# Patient Record
Sex: MALE | Race: WHITE | NOT HISPANIC OR LATINO | Employment: OTHER | URBAN - METROPOLITAN AREA
[De-identification: names, ages, dates, MRNs, and addresses within clinical notes are randomized per-mention and may not be internally consistent; named-entity substitution may affect disease eponyms.]

---

## 2017-09-07 ENCOUNTER — OFFICE VISIT (OUTPATIENT)
Dept: SLEEP MEDICINE | Facility: CLINIC | Age: 77
End: 2017-09-07
Payer: MEDICARE

## 2017-09-07 VITALS
HEIGHT: 69 IN | HEART RATE: 67 BPM | RESPIRATION RATE: 16 BRPM | WEIGHT: 227.4 LBS | OXYGEN SATURATION: 96 % | SYSTOLIC BLOOD PRESSURE: 122 MMHG | DIASTOLIC BLOOD PRESSURE: 77 MMHG | BODY MASS INDEX: 33.68 KG/M2

## 2017-09-07 DIAGNOSIS — G47.33 OSA (OBSTRUCTIVE SLEEP APNEA): Primary | ICD-10-CM

## 2017-09-07 PROCEDURE — 99214 OFFICE O/P EST MOD 30 MIN: CPT | Performed by: PHYSICIAN ASSISTANT

## 2017-09-07 NOTE — MR AVS SNAPSHOT
After Visit Summary   9/7/2017    Ranjan Pena    MRN: [id number]           Patient Information     Date Of Birth          1940        Visit Information        Provider Department      9/7/2017 10:30 AM Goltz, Bennett Ezra, PA-C [facility]        Today's Diagnoses     DEB (obstructive sleep apnea)    -  1      Care Instructions      Your BMI is Body mass index is 33.58 kg/(m^2).  Weight management is a personal decision.  If you are interested in exploring weight loss strategies, the following discussion covers the approaches that may be successful. Body mass index (BMI) is one way to tell whether you are at a healthy weight, overweight, or obese. It measures your weight in relation to your height.  A BMI of 18.5 to 24.9 is in the healthy range. A person with a BMI of 25 to 29.9 is considered overweight, and someone with a BMI of 30 or greater is considered obese. More than two-thirds of American adults are considered overweight or obese.  Being overweight or obese increases the risk for further weight gain. Excess weight may lead to heart disease and diabetes.  Creating and following plans for healthy eating and physical activity may help you improve your health.  Weight control is part of healthy lifestyle and includes exercise, emotional health, and healthy eating habits. Careful eating habits lifelong are the mainstay of weight control. Though there are significant health benefits from weight loss, long-term weight loss with diet alone may be very difficult to achieve- studies show long-term success with dietary management in less than 10% of people. Attaining a healthy weight may be especially difficult to achieve in those with severe obesity. In some cases, medications, devices and surgical management might be considered.  What can you do?  If you are overweight or obese and are interested in methods for weight loss, you should discuss this with your provider.      Consider reducing daily calorie intake by 500 calories.     Keep a food journal.     Avoiding skipping meals, consider cutting portions instead.    Diet combined with exercise helps maintain muscle while optimizing fat loss. Strength training is particularly important for building and maintaining muscle mass. Exercise helps reduce stress, increase energy, and improves fitness. Increasing exercise without diet control, however, may not burn enough calories to loose weight.       Start walking three days a week 10-20 minutes at a time    Work towards walking thirty minutes five days a week     Eventually, increase the speed of your walking for 1-2 minutes at time    In addition, we recommend that you review healthy lifestyles and methods for weight loss available through the National Institutes of Health patient information sites:  http://win.niddk.nih.gov/publications/index.htm    And look into health and wellness programs that may be available through your health insurance provider, employer, local community center, or neeraj club.    Weight management plan: Patient was referred to their PCP to discuss a diet and exercise plan.            Follow-ups after your visit        Who to contact     If you have questions or need follow up information about today's clinic visit or your schedule please contact [facility] directly at [phone].  Normal or non-critical lab and imaging results will be communicated to you by MyChart, letter or phone within 4 business days after the clinic has received the results. If you do not hear from us within 7 days, please contact the clinic through FPSIhart or phone. If you have a critical or abnormal lab result, we will notify you by phone as soon as possible.  Submit refill requests through [employer] or call your pharmacy and they will forward the refill request to us. Please allow 3 business days for your refill to be completed.          Additional Information About  "Your Visit        MyChart Information     [employer] ([employer]le[employer]) lets you send messages to your doctor, view your test results, renew your prescriptions, schedule appointments and more. To sign up, go to [email or web address] (CircleMe) . Click on \"Log in\" on the left side of the screen, which will take you to the Welcome page. Then click on \"Sign up Now\" on the right side of the page.     You will be asked to enter the access code listed below, as well as some personal information. Please follow the directions to create your username and password.     Your access code is: [email or web address]  Expires: 2017 10:47 AM     Your access code will  in 90 days. If you need help or a new code, please call your [place] clinic or [phone].        Care EveryWhere ID     This is your Care EveryWhere ID. This could be used by other organizations to access your [place] medical records  [id number]        Your Vitals Were     Pulse Respirations Height Pulse Oximetry BMI (Body Mass Index)       67 16 1.753 m (5' 9\") 96% 33.58 kg/m2        Blood Pressure from Last 3 Encounters:   17 122/77   16 120/78   16 136/85    Weight from Last 3 Encounters:   17 103.1 kg (227 lb 6.4 oz)   16 99.7 kg (219 lb 11.2 oz)   16 99.3 kg (219 lb)              We Performed the Following     Comprehensive DME        Primary Care Provider Office Phone # Fax #    Peter Nicole [phone]       [address] [place] [phone]        Equal Access to Services     [facility] DENNIS : Hadii vangie Bush, wadianelysda gopi, qaybta félixalmagopal keller, berhane pérez. So [place] [phone].    ATENCIÓN: Si habla español, tiene a pratt disposición servicios gratuitos de asistencia lingüística. Llame al [phone].    We comply with applicable federal civil rights laws and [place] laws. We do not discriminate on the basis of race, color, national origin, age, disability sex, sexual " orientation or gender identity.            Thank you!     Thank you for choosing [facility]  for your care. Our goal is always to provide you with excellent care. Hearing back from our patients is one way we can continue to improve our services. Please take a few minutes to complete the written survey that you may receive in the mail after your visit with us. Thank you!             Your Updated Medication List - Protect others around you: Learn how to safely use, store and throw away your medicines at www.disposemymeds.org.          This list is accurate as of: 9/7/17 10:47 AM.  Always use your most recent med list.                   Brand Name Dispense Instructions for use Diagnosis    albuterol 108 (90 BASE) MCG/ACT Inhaler    PROAIR HFA/PROVENTIL HFA/VENTOLIN HFA    1 Inhaler    Inhale 1 puff into the lungs every 4 hours as needed for shortness of breath / dyspnea or wheezing    Intermittent asthma, uncomplicated       FLOMAX 0.4 MG capsule   Generic drug:  tamsulosin      Take 1 capsule (0.4 mg) by mouth daily    BPH (benign prostatic hypertrophy)       fluticasone 110 MCG/ACT Inhaler    FLOVENT HFA    1 Inhaler    Inhale 2 puffs into the lungs 2 times daily    Intermittent asthma, with acute exacerbation       loratadine 10 MG capsule    CLARITIN    30 capsule    Take 10 mg by mouth daily. For allergies    ETD (eustachian tube dysfunction)       meclizine 25 MG tablet    ANTIVERT    30 tablet    Take 1 tablet (25 mg) by mouth 3 times daily as needed For dizziness    ETD (eustachian tube dysfunction)       order for DME     1 each    Equipment being ordered: CPAP machine + all supplies incld mask    DEB (obstructive sleep apnea)       timolol 0.5 % ophthalmic solution    TIMOPTIC          XALATAN 0.005 % ophthalmic solution   Generic drug:  latanoprost      Place 1 drop into both eyes At Bedtime

## 2017-09-07 NOTE — PATIENT INSTRUCTIONS

## 2017-09-07 NOTE — NURSING NOTE
"Chief Complaint   Patient presents with     CPAP Follow Up     annual f/u       Initial /77  Pulse 67  Resp 16  Ht 1.753 m (5' 9\")  Wt 103.1 kg (227 lb 6.4 oz)  SpO2 96%  BMI 33.58 kg/m2 Estimated body mass index is 33.58 kg/(m^2) as calculated from the following:    Height as of this encounter: 1.753 m (5' 9\").    Weight as of this encounter: 103.1 kg (227 lb 6.4 oz).  Medication Reconciliation: complete     ESS 5  Louisa Rangel    "

## 2017-09-20 ENCOUNTER — OFFICE VISIT (OUTPATIENT)
Dept: INTERNAL MEDICINE | Facility: CLINIC | Age: 77
End: 2017-09-20
Payer: MEDICARE

## 2017-09-20 VITALS
OXYGEN SATURATION: 95 % | HEART RATE: 55 BPM | DIASTOLIC BLOOD PRESSURE: 78 MMHG | BODY MASS INDEX: 33.14 KG/M2 | SYSTOLIC BLOOD PRESSURE: 126 MMHG | TEMPERATURE: 98.1 F | WEIGHT: 224.4 LBS

## 2017-09-20 DIAGNOSIS — J45.30 MILD PERSISTENT ASTHMA WITHOUT COMPLICATION: ICD-10-CM

## 2017-09-20 DIAGNOSIS — Z13.6 CARDIOVASCULAR SCREENING; LDL GOAL LESS THAN 160: ICD-10-CM

## 2017-09-20 DIAGNOSIS — Z00.00 MEDICARE ANNUAL WELLNESS VISIT, SUBSEQUENT: Primary | ICD-10-CM

## 2017-09-20 PROCEDURE — G0439 PPPS, SUBSEQ VISIT: HCPCS | Performed by: INTERNAL MEDICINE

## 2017-09-20 RX ORDER — FLUTICASONE PROPIONATE 110 UG/1
2 AEROSOL, METERED RESPIRATORY (INHALATION) 2 TIMES DAILY
Qty: 1 INHALER | Refills: 11 | Status: SHIPPED | OUTPATIENT
Start: 2017-09-20 | End: 2020-08-24

## 2017-09-20 RX ORDER — ALBUTEROL SULFATE 90 UG/1
1 AEROSOL, METERED RESPIRATORY (INHALATION) EVERY 4 HOURS PRN
Qty: 1 INHALER | Refills: 11 | Status: SHIPPED | OUTPATIENT
Start: 2017-09-20 | End: 2020-08-24

## 2017-09-20 NOTE — PROGRESS NOTES
SUBJECTIVE:   Ranjan Pena is a 77 year old male who presents for Preventive Visit.    Are you in the first 12 months of your Medicare coverage?  No    Physical   Annual:     Getting at least 3 servings of Calcium per day::  Yes    Bi-annual eye exam::  Yes    Dental care twice a year::  Yes    Sleep apnea or symptoms of sleep apnea::  Sleep apnea    Taking medications regularly::  Yes    Additional concerns today::  No      COGNITIVE SCREEN  1) Repeat 3 items (Banana, Sunrise, Chair)    2) Clock draw: NORMAL  3) 3 item recall: Recalls 2 objects   Results: NORMAL clock, 1-2 items recalled: COGNITIVE IMPAIRMENT LESS LIKELY    Mini-CogTM Copyright S Toni. Licensed by the author for use in [facility]; reprinted with permission (soob@[facility]). All rights reserved.      Reviewed and updated as needed this visit by clinical staff         Reviewed and updated as needed this visit by Provider      Social History   Substance Use Topics     Smoking status: Former Smoker     Packs/day: 1.00     Years: 5.00     Types: Cigarettes     Quit date: 1/1/1965     Smokeless tobacco: Never Used     Alcohol use 0.6 oz/week     1 Standard drinks or equivalent per week      Comment: 1 per month       The patient does not drink >3 drinks per day nor >7 drinks per week.        Today's PHQ-2 Score: PHQ-2 ( 1999 Pfizer) 9/20/2017   Q1: Little interest or pleasure in doing things 0   Q2: Feeling down, depressed or hopeless 0   PHQ-2 Score 0   Q1: Little interest or pleasure in doing things Not at all   Q2: Feeling down, depressed or hopeless Not at all   PHQ-2 Score 0       Do you feel safe in your environment - Yes    Do you have a Health Care Directive?: Yes: Advance Directive has been received and scanned.    Current providers sharing in care for this patient include:   Patient Care Team:  Peter Nicole MD as PCP - General (Internal Medicine)      Hearing impairment: No    Ability to successfully perform activities of  "daily living: Yes, no assistance needed     Fall risk:  Fallen 2 or more times in the past year?: No  Any fall with injury in the past year?: No      Home safety:  none identified      The following health maintenance items are reviewed in Epic and correct as of today:Health Maintenance   Topic Date Due     MEDICARE ANNUAL WELLNESS VISIT  07/13/2016     ASTHMA CONTROL TEST Q6 MOS  09/17/2016     ASTHMA ACTION PLAN Q1 YR  03/17/2017     INFLUENZA VACCINE (SYSTEM ASSIGNED)  09/01/2017     FALL RISK ASSESSMENT  12/06/2017     LIPID SCREEN Q5 YR MALE (SYSTEM ASSIGNED)  06/04/2019     ADVANCE DIRECTIVE PLANNING Q5 YRS  06/10/2019     TETANUS IMMUNIZATION (SYSTEM ASSIGNED)  03/08/2026     PNEUMOCOCCAL  Completed     Labs reviewed in EPIC      ROS:  Constitutional, HEENT, cardiovascular, pulmonary, GI, , musculoskeletal, neuro, skin, endocrine and psych systems are negative, except as otherwise noted.      OBJECTIVE:   There were no vitals taken for this visit. Estimated body mass index is 33.58 kg/(m^2) as calculated from the following:    Height as of 9/7/17: 5' 9\" (1.753 m).    Weight as of 9/7/17: 227 lb 6.4 oz (103.1 kg).  EXAM:   GENERAL: healthy, alert and no distress  EYES: Eyes grossly normal to inspection, PERRL and conjunctivae and sclerae normal  HENT: ear canals and TM's normal, nose and mouth without ulcers or lesions  NECK: no adenopathy, no asymmetry, masses, or scars and thyroid normal to palpation  RESP: lungs clear to auscultation - no rales, rhonchi or wheezes  CV: regular rate and rhythm, normal S1 S2, no S3 or S4, no murmur, click or rub, no peripheral edema and peripheral pulses strong  ABDOMEN: soft, nontender, no hepatosplenomegaly, no masses and bowel sounds normal  MS: no gross musculoskeletal defects noted, no edema  SKIN: no suspicious lesions or rashes  NEURO: Normal strength and tone, mentation intact and speech normal  PSYCH: mentation appears normal, affect normal/bright       ASSESSMENT " "/ PLAN:   1. Medicare annual wellness visit, subsequent  2. CARDIOVASCULAR SCREENING; LDL GOAL LESS THAN 160  Doing quite well- uptodate on screening    3. Mild persistent asthma without complication  - fluticasone (FLOVENT HFA) 110 MCG/ACT Inhaler; Inhale 2 puffs into the lungs 2 times daily  Dispense: 1 Inhaler; Refill: 11  - albuterol (PROAIR HFA/PROVENTIL HFA/VENTOLIN HFA) 108 (90 BASE) MCG/ACT Inhaler; Inhale 1 puff into the lungs every 4 hours as needed for shortness of breath / dyspnea or wheezing  Dispense: 1 Inhaler; Refill: 11    End of Life Planning:  Patient currently has an advanced directive: Yes.  Practitioner is supportive of decision.    COUNSELING:  Reviewed preventive health counseling, as reflected in patient instructions        Estimated body mass index is 33.58 kg/(m^2) as calculated from the following:    Height as of 9/7/17: 5' 9\" (1.753 m).    Weight as of 9/7/17: 227 lb 6.4 oz (103.1 kg).     reports that he quit smoking about 52 years ago. His smoking use included Cigarettes. He has a 5.00 pack-year smoking history. He has never used smokeless tobacco.        Appropriate preventive services were discussed with this patient, including applicable screening as appropriate for cardiovascular disease, diabetes, osteopenia/osteoporosis, and glaucoma.  As appropriate for age/gender, discussed screening for colorectal cancer, prostate cancer, breast cancer, and cervical cancer. Checklist reviewing preventive services available has been given to the patient.    Reviewed patients plan of care and provided an AVS. The Basic Care Plan (routine screening as documented in Health Maintenance) for Ranjan meets the Care Plan requirement. This Care Plan has been established and reviewed with the Patient.    Counseling Resources:  ATP IV Guidelines  Pooled Cohorts Equation Calculator  Breast Cancer Risk Calculator  FRAX Risk Assessment  ICSI Preventive Guidelines  Dietary Guidelines for Americans, 2010  USDA's " MyPlate  ASA Prophylaxis  Lung CA Screening    Peter Nicole MD  [facility] for HPI/ROS submitted by the patient on 9/20/2017   PHQ-2 Score: 0

## 2017-09-20 NOTE — MR AVS SNAPSHOT
"              After Visit Summary   9/20/2017    Ranjan Pena    MRN: [id number]           Patient Information     Date Of Birth          1940        Visit Information        Provider Department      9/20/2017 7:20 AM Peter Nicole MD [facility]        Today's Diagnoses     Medicare annual wellness visit, subsequent    -  1    CARDIOVASCULAR SCREENING; LDL GOAL LESS THAN 160        Mild persistent asthma without complication           Follow-ups after your visit        Your next 10 appointments already scheduled     Sep 10, 2018  8:30 AM CDT   Return Sleep Patient with Bennett Ezra Goltz, PA-C   [facility] ([facility])    [address] [address] [phone]              Who to contact     If you have questions or need follow up information about today's clinic visit or your schedule please contact [facility] directly at [phone].  Normal or non-critical lab and imaging results will be communicated to you by MyChart, letter or phone within 4 business days after the clinic has received the results. If you do not hear from us within 7 days, please contact the clinic through [employer] or phone. If you have a critical or abnormal lab result, we will notify you by phone as soon as possible.  Submit refill requests through [employer] or call your pharmacy and they will forward the refill request to us. Please allow 3 business days for your refill to be completed.          Additional Information About Your Visit        It[employer]ix[employer] Information     [employer] lets you send messages to your doctor, view your test results, renew your prescriptions, schedule appointments and more. To sign up, go to [email or web address] . Click on \"Log in\" on the left side of the screen, which will take you to the Welcome page. Then click on \"Sign up Now\" on the right side of the page.     You will be asked to enter " the access code listed below, as well as some personal information. Please follow the directions to create your username and password.     Your access code is: [email or web address]  Expires: 2017 10:47 AM     Your access code will  in 90 days. If you need help or a new code, please call your [place] clinic or [phone].        Care EveryWhere ID     This is your Care EveryWhere ID. This could be used by other organizations to access your [place] medical records  [id number]        Your Vitals Were     Pulse Temperature Pulse Oximetry BMI (Body Mass Index)          55 98.1  F (36.7  C) (Oral) 95% 33.14 kg/m2         Blood Pressure from Last 3 Encounters:   17 126/78   17 122/77   16 120/78    Weight from Last 3 Encounters:   17 224 lb 6.4 oz (101.8 kg)   17 227 lb 6.4 oz (103.1 kg)   16 219 lb 11.2 oz (99.7 kg)              Today, you had the following     No orders found for display         Where to get your medicines      These medications were sent to [facility] [address] [place] [address] [address] [place] [address] [address] [facility] [address]    Hours:  24-hours Phone:  [phone]     albuterol 108 (90 BASE) MCG/ACT Inhaler    fluticasone 110 MCG/ACT Inhaler          Primary Care Provider Office Phone # Fax #    Peter Nicole [phone]       [address] [address]        Equal Access to Services     [facility]: Hadii vangie Bush, waaxda luqadaha, qaybta kaalmada arianna, berhane pérez. So [facility] [phone].    ATENCIÓN: Si habla español, tiene a pratt disposición servicios gratuitos de asistencia lingüística. Llame al [phone].    We comply with applicable federal civil rights laws and [place] laws. We do not discriminate on the basis of race, color, national origin, age, disability sex, sexual orientation or gender identity.             Thank you!     Thank you for choosing [facility]  for your care. Our goal is always to provide you with excellent care. Hearing back from our patients is one way we can continue to improve our services. Please take a few minutes to complete the written survey that you may receive in the mail after your visit with us. Thank you!             Your Updated Medication List - Protect others around you: Learn how to safely use, store and throw away your medicines at www.disposemymeds.org.          This list is accurate as of: 9/20/17  7:51 AM.  Always use your most recent med list.                   Brand Name Dispense Instructions for use Diagnosis    albuterol 108 (90 BASE) MCG/ACT Inhaler    PROAIR HFA/PROVENTIL HFA/VENTOLIN HFA    1 Inhaler    Inhale 1 puff into the lungs every 4 hours as needed for shortness of breath / dyspnea or wheezing    Mild persistent asthma without complication       FLOMAX 0.4 MG capsule   Generic drug:  tamsulosin      Take 1 capsule (0.4 mg) by mouth daily    BPH (benign prostatic hypertrophy)       fluticasone 110 MCG/ACT Inhaler    FLOVENT HFA    1 Inhaler    Inhale 2 puffs into the lungs 2 times daily    Mild persistent asthma without complication       loratadine 10 MG capsule    CLARITIN    30 capsule    Take 10 mg by mouth daily. For allergies    ETD (eustachian tube dysfunction)       meclizine 25 MG tablet    ANTIVERT    30 tablet    Take 1 tablet (25 mg) by mouth 3 times daily as needed For dizziness    ETD (eustachian tube dysfunction)       order for DME     1 each    Equipment being ordered: CPAP machine + all supplies incld mask    DEB (obstructive sleep apnea)       timolol 0.5 % ophthalmic solution    TIMOPTIC          XALATAN 0.005 % ophthalmic solution   Generic drug:  latanoprost      Place 1 drop into both eyes At Bedtime

## 2017-09-21 DIAGNOSIS — Z13.6 CARDIOVASCULAR SCREENING; LDL GOAL LESS THAN 160: ICD-10-CM

## 2017-09-21 LAB
ANION GAP SERPL CALCULATED.3IONS-SCNC: 8 MMOL/L (ref 3–14)
BUN SERPL-MCNC: 15 MG/DL (ref 7–30)
CALCIUM SERPL-MCNC: 8.7 MG/DL (ref 8.5–10.1)
CHLORIDE SERPL-SCNC: 108 MMOL/L (ref 94–109)
CHOLEST SERPL-MCNC: 138 MG/DL
CO2 SERPL-SCNC: 25 MMOL/L (ref 20–32)
CREAT SERPL-MCNC: 1.17 MG/DL (ref 0.66–1.25)
GFR SERPL CREATININE-BSD FRML MDRD: 60 ML/MIN/1.7M2
GLUCOSE SERPL-MCNC: 98 MG/DL (ref 70–99)
HDLC SERPL-MCNC: 43 MG/DL
LDLC SERPL CALC-MCNC: 69 MG/DL
NONHDLC SERPL-MCNC: 95 MG/DL
POTASSIUM SERPL-SCNC: 4.1 MMOL/L (ref 3.4–5.3)
SODIUM SERPL-SCNC: 141 MMOL/L (ref 133–144)
TRIGL SERPL-MCNC: 130 MG/DL

## 2017-09-21 PROCEDURE — 80061 LIPID PANEL: CPT | Performed by: INTERNAL MEDICINE

## 2017-09-21 PROCEDURE — 80048 BASIC METABOLIC PNL TOTAL CA: CPT | Performed by: INTERNAL MEDICINE

## 2017-09-21 PROCEDURE — 36415 COLL VENOUS BLD VENIPUNCTURE: CPT | Performed by: INTERNAL MEDICINE

## 2018-02-22 ENCOUNTER — TRANSFERRED RECORDS (OUTPATIENT)
Dept: HEALTH INFORMATION MANAGEMENT | Facility: CLINIC | Age: 78
End: 2018-02-22

## 2018-02-22 LAB — EJECTION FRACTION: 53

## 2018-02-28 ENCOUNTER — TRANSFERRED RECORDS (OUTPATIENT)
Dept: HEALTH INFORMATION MANAGEMENT | Facility: CLINIC | Age: 78
End: 2018-02-28

## 2018-03-19 ENCOUNTER — OFFICE VISIT (OUTPATIENT)
Dept: INTERNAL MEDICINE | Facility: CLINIC | Age: 78
End: 2018-03-19
Payer: MEDICARE

## 2018-03-19 VITALS
RESPIRATION RATE: 16 BRPM | WEIGHT: 220.6 LBS | SYSTOLIC BLOOD PRESSURE: 126 MMHG | TEMPERATURE: 98.2 F | DIASTOLIC BLOOD PRESSURE: 80 MMHG | HEART RATE: 65 BPM | BODY MASS INDEX: 32.67 KG/M2 | HEIGHT: 69 IN | OXYGEN SATURATION: 95 %

## 2018-03-19 DIAGNOSIS — J06.9 VIRAL URI WITH COUGH: ICD-10-CM

## 2018-03-19 DIAGNOSIS — M79.672 PAIN OF LEFT HEEL: ICD-10-CM

## 2018-03-19 DIAGNOSIS — J45.30 MILD PERSISTENT ASTHMA WITHOUT COMPLICATION: Primary | ICD-10-CM

## 2018-03-19 DIAGNOSIS — G47.33 OSA (OBSTRUCTIVE SLEEP APNEA): ICD-10-CM

## 2018-03-19 PROCEDURE — 99214 OFFICE O/P EST MOD 30 MIN: CPT | Performed by: INTERNAL MEDICINE

## 2018-03-19 ASSESSMENT — PAIN SCALES - GENERAL: PAINLEVEL: NO PAIN (0)

## 2018-03-19 NOTE — PROGRESS NOTES
"  SUBJECTIVE:   Ranjan Pena is a 77 year old male who presents to clinic today for the following health issues:    Patient needs replacement parts for his cpap    Also c/o rhinorrhea, cough- non-productive.  Has asthma but hasn't noticed any need to use his inhaler.     He also brings in 2 medical reports from [place]. Went into doctors office feeling fatigued and thinking he had the flu. Noted to be a bit bradycardiac- sent to a cardiologist. Had echo and stress thallium . Both normal.    Problem list and histories reviewed & adjusted, as indicated.  Additional history: as documented    Labs reviewed in EPIC    Reviewed and updated as needed this visit by clinical staff  Allergies  Meds       Reviewed and updated as needed this visit by Provider         ROS:  Constitutional, HEENT, cardiovascular, pulmonary, gi and gu systems are negative, except as otherwise noted.  MUSCULOSKELETAL: neck stiffness w/some limitation in ROM, plantar foot pain     OBJECTIVE:                                                    /80  Pulse 65  Temp 98.2  F (36.8  C) (Oral)  Resp 16  Ht 5' 9\" (1.753 m)  Wt 220 lb 9.6 oz (100.1 kg)  SpO2 95%  BMI 32.58 kg/m2  Body mass index is 32.58 kg/(m^2).  GENERAL APPEARANCE: alert and no distress  Neck: slight limitation in flexion and extension.   RESP: lungs clear to auscultation - no rales, rhonchi or wheezes  CV: regular rates and rhythm, normal S1 S2, no S3 or S4 and no murmur, click or rub  MS: extremities normal- no gross deformities noted. Mild tenderness L heel- plantar surface.      Diagnostic test results:  none      ASSESSMENT/PLAN:                                                    1. DEB (obstructive sleep apnea)  - order for DME; Equipment being ordered: CPAP machine + all supplies incld mask  Dispense: 1 each; Refill: 0    2.  Mild persistent asthma without complication  3. URI  - monitor    4. Heel pain  consistent with heel spur vs very mild PF  Stretching, heel pad prn "       Fall 2018    Peter Nicole MD  [facility]

## 2018-03-19 NOTE — MR AVS SNAPSHOT
"              After Visit Summary   3/19/2018    Ranjan Pena    MRN: [id number]           Patient Information     Date Of Birth          1940        Visit Information        Provider Department      3/19/2018 7:00 AM Peter Nicole MD [facility]        Today's Diagnoses     Mild persistent asthma without complication    -  1    EDB (obstructive sleep apnea)           Follow-ups after your visit        Your next 10 appointments already scheduled     Sep 10, 2018  8:30 AM CDT   Return Sleep Patient with Bennett Ezra Goltz, PA-C   [facility] ([facility])    [address] [address] [phone]              Who to contact     If you have questions or need follow up information about today's clinic visit or your schedule please contact [facility] [place] directly at [phone].  Normal or non-critical lab and imaging results will be communicated to you by MyChart, letter or phone within 4 business days after the clinic has received the results. If you do not hear from us within 7 days, please contact the clinic through Unowhyhart or phone. If you have a critical or abnormal lab result, we will notify you by phone as soon as possible.  Submit refill requests through IQMax or call your pharmacy and they will forward the refill request to us. Please allow 3 business days for your refill to be completed.          Additional Information About Your Visit        MyChart Information     IQMax lets you send messages to your doctor, view your test results, renew your prescriptions, schedule appointments and more. To sign up, go to [email or web address] . Click on \"Log in\" on the left side of the screen, which will take you to the Welcome page. Then click on \"Sign up Now\" on the right side of the page.     You will be asked to enter the access code listed below, as well as some personal information. " "Please follow the directions to create your username and password.     Your access code is: [email or web address]  Expires: 2018  7:34 AM     Your access code will  in 90 days. If you need help or a new code, please call your [place] clinic or [phone].        Care EveryWhere ID     This is your Care EveryWhere ID. This could be used by other organizations to access your [place] medical records  [id number]        Your Vitals Were     Pulse Temperature Respirations Height Pulse Oximetry BMI (Body Mass Index)    65 98.2  F (36.8  C) (Oral) 16 5' 9\" (1.753 m) 95% 32.58 kg/m2       Blood Pressure from Last 3 Encounters:   18 126/80   17 126/78   17 122/77    Weight from Last 3 Encounters:   18 220 lb 9.6 oz (100.1 kg)   17 224 lb 6.4 oz (101.8 kg)   17 227 lb 6.4 oz (103.1 kg)              Today, you had the following     No orders found for display         Where to get your medicines      Some of these will need a paper prescription and others can be bought over the counter.  Ask your nurse if you have questions.     Bring a paper prescription for each of these medications     order for DME          Primary Care Provider Office Phone # Fax #    Peter Nicole [phone]       [address] [place] [address]        Equal Access to Services     ABRIL COLLINS AH: Hadii aad ku hadasho Soyazminali, waaxda luqadaha, qaybta kaalmada arianna, berhane de la cruz . So [facility] [phone].    ATENCIÓN: Si habla español, tiene a pratt disposición servicios gratuitos de asistencia lingüística. Llame al [phone].    We comply with applicable federal civil rights laws and [place] laws. We do not discriminate on the basis of race, color, national origin, age, disability, sex, sexual orientation, or gender identity.            Thank you!     Thank you for choosing [facility]  for your care. Our goal is always to " provide you with excellent care. Hearing back from our patients is one way we can continue to improve our services. Please take a few minutes to complete the written survey that you may receive in the mail after your visit with us. Thank you!             Your Updated Medication List - Protect others around you: Learn how to safely use, store and throw away your medicines at www.disposemymeds.org.          This list is accurate as of 3/19/18  7:34 AM.  Always use your most recent med list.                   Brand Name Dispense Instructions for use Diagnosis    albuterol 108 (90 BASE) MCG/ACT Inhaler    PROAIR HFA/PROVENTIL HFA/VENTOLIN HFA    1 Inhaler    Inhale 1 puff into the lungs every 4 hours as needed for shortness of breath / dyspnea or wheezing    Mild persistent asthma without complication       FLOMAX 0.4 MG capsule   Generic drug:  tamsulosin      Take 1 capsule (0.4 mg) by mouth daily    BPH (benign prostatic hypertrophy)       fluticasone 110 MCG/ACT Inhaler    FLOVENT HFA    1 Inhaler    Inhale 2 puffs into the lungs 2 times daily    Mild persistent asthma without complication       loratadine 10 MG capsule    CLARITIN    30 capsule    Take 10 mg by mouth daily. For allergies    ETD (eustachian tube dysfunction)       meclizine 25 MG tablet    ANTIVERT    30 tablet    Take 1 tablet (25 mg) by mouth 3 times daily as needed For dizziness    ETD (eustachian tube dysfunction)       order for DME     1 each    Equipment being ordered: CPAP machine + all supplies incld mask    DEB (obstructive sleep apnea)       timolol 0.5 % ophthalmic solution    TIMOPTIC          XALATAN 0.005 % ophthalmic solution   Generic drug:  latanoprost      Place 1 drop into both eyes At Bedtime

## 2018-07-11 ENCOUNTER — TELEPHONE (OUTPATIENT)
Dept: INTERNAL MEDICINE | Facility: CLINIC | Age: 78
End: 2018-07-11

## 2018-07-11 DIAGNOSIS — G47.33 OSA (OBSTRUCTIVE SLEEP APNEA): Primary | ICD-10-CM

## 2018-07-11 NOTE — TELEPHONE ENCOUNTER
"Patient is needing order rewritten for cpap machine because they are needing the pressure # on the rx in order for them to provide patient with equipment. Pressure # is \"6\" Patient is also asking If he could get this order by tomorrow and he will pick it up from . Please contact patient when order is ready.   "

## 2018-07-11 NOTE — TELEPHONE ENCOUNTER
An order was placed for a replacement auto CPAP with pressures 6-10 cm. He was thinking about using a home medical company in [place] where he lives part of the time.  He will be leaving to go there next week.  I gave him contact information for the [facility] medical location so he can call them and coordinate either an appointment to get machine through them or to have the necessary paperwork sent to wherever he would like to go in [place].  Bennett Goltz, PA-C

## 2018-07-11 NOTE — TELEPHONE ENCOUNTER
This should be done thru the  sleep clinic.  Inform pt we can forward this message to them to provide the needed specific documentation for his CPAP supplies.

## 2018-07-12 ENCOUNTER — DOCUMENTATION ONLY (OUTPATIENT)
Dept: SLEEP MEDICINE | Facility: CLINIC | Age: 78
End: 2018-07-12

## 2018-07-12 NOTE — Clinical Note
Hi Bennett Goltz, PA-C,  I called patient to try and transfer his DME care for CPAP and supplies to [facility]. Patient stated that he met with our staff at [facility] and received all the information on the transfer process. He stated he is leaving on vacation soon and will be gone for a long period of time. Patient said he does not want to transfer his care to [facility] at this time. He has the paperwork and our phone number if he decides to switch over to [place] [facility]. Patient stated he will think about it and may call us around October.   Thanks, Crystal MONTEIRO

## 2018-07-12 NOTE — PROGRESS NOTES
Received documents for a replacement PAP and supplies. Called patient to see if he would like to transfer his DME care to [facility] for his PAP and supplies. Patient stated that he is leaving town soon and does not want to transfer his care at this time. Patient was given our phone number and stated he will think about it in October. Patient knows he can call [facility] if he has any further questions.

## 2018-10-01 ENCOUNTER — OFFICE VISIT (OUTPATIENT)
Dept: SLEEP MEDICINE | Facility: CLINIC | Age: 78
End: 2018-10-01
Payer: MEDICARE

## 2018-10-01 VITALS
TEMPERATURE: 97.5 F | WEIGHT: 222.4 LBS | DIASTOLIC BLOOD PRESSURE: 83 MMHG | HEART RATE: 62 BPM | RESPIRATION RATE: 16 BRPM | SYSTOLIC BLOOD PRESSURE: 134 MMHG | OXYGEN SATURATION: 95 % | HEIGHT: 69 IN | BODY MASS INDEX: 32.94 KG/M2

## 2018-10-01 DIAGNOSIS — G47.33 OSA (OBSTRUCTIVE SLEEP APNEA): Primary | ICD-10-CM

## 2018-10-01 PROCEDURE — 99214 OFFICE O/P EST MOD 30 MIN: CPT | Performed by: PHYSICIAN ASSISTANT

## 2018-10-01 NOTE — PATIENT INSTRUCTIONS

## 2018-10-01 NOTE — PROGRESS NOTES
Sleep Study Follow-Up Visit:    Date on this visit: 10/1/2018    Ranjan Pena comes in today for follow-up of his CPAP use for treatment of moderate DEB and hypoxemia. He was initially seen at the [facility] for trouble keeping his CPAP mask on through the night for 18 years. His medical history is significant for DEB (initially diagnosed in 2006), asthma, glaucoma and nephrolithiasis.      His sleep study here showed:  AHI was 29.8/hr, with desaturations down to 74%. He spent 17.2 minutes below 90% SpO2 and 9.9 minutes below 89%. RDI 29.8/hr.  REM RDI 39.4/hr.  Supine RDI 59/hr.  His lateral RDI was 12/hr. Periodic Limb Movement Index 24/hour, <1/hr were associated with arousals.      He is on auto CPAP 6-10 cm. He uses a nasal pillows mask. He was trying to get a new machine from a place in [place] (where he lives half the time). They have been giving him the run around. His mask fits well and he likes the mask. He occasionally gets dry mouth. His wife occasionally observes snoring with CPAP. He has felt the pressures were a little high at times. He does not use the humidifier.   The compliance data shows that he has used the CPAP for 29/30 nights, 66.7% of nights for >4 hours.  The 90th% pressure is 9.4 cm.  The average time in large leak is 6 sec.  The average nightly usage is 5:23.  The average AHI is 1.7/hr. His pressures often start at the lower limit for a couple of hours and then they shoot up to the upper limit in response to some snoring. He sometimes gets mouth leak when the pressure is at the upper limit.    Past medical/surgical history, family history, social history, medications and allergies were reviewed.      Problem List:  Patient Active Problem List    Diagnosis Date Noted     Bladder polyp 06/04/2014     Priority: Medium     DEB (obstructive sleep apnea)      Priority: Medium     Split Night:  Sleep Study 6/9/2016 - (212.0 lbs) AHI 29.8, RDI 29.8, Supine AHI 59.0, REM  AHI 47.1, Low O2% 73.7%, Time Spent ?88% 9.9, Time Spent ?89% 17.2, CPAP was titrated to a pressure of 11.       Advance Care Planning 12/20/2011     Priority: Medium     Advance Care Planning: Receipt of ACP document:  Received: Health Care Directive which was witnessed or notarized on 6-10-14.  Document previously scanned on 6-11-14.  Validation form completed and sent to be scanned.  Code Status reflects choices in most recent ACP document.  Confirmed/documented designated decision maker(s). See permanent comments section of demographics in clinical tab. View document(s) and details by clicking on code status. Added by Marilyn Downs RN, System ACP Coordinator [employer] on 7/2/2014.  Advance Care Planning: Initial facilitation introduction:   Ranjan Pena presented for initial session regarding ACP at the clinic. He was accompanied by self. [employer] Choices information provided and resources reviewed. He currently wishes to complete an ACP document.  He currently has the following questions or concerns about Advance Care Planning: none.  Confirmed/documented designated decision maker(s). See permanent comments section of demographics in clinical tab. Orders entered for code status to reflect current choices.Added by Nancy Fischer on 6/10/2014  Patient states has Advance Directive and will bring in a copy to clinic. 12/20/2011 Brionna Martinez           Mild persistent asthma      Priority: Medium     CARDIOVASCULAR SCREENING; LDL GOAL LESS THAN 160 10/31/2010     Priority: Medium        Impression/Plan:    (G47.33) DEB (obstructive sleep apnea)  (primary encounter diagnosis)  Comment: His total use is improving, but still just under compliance recommendations. He sometimes does not put the mask back on when he gets up in the middle of the night. He sometimes gets mouth leak when the pressure goes up to the upper limit (which is often in response to snoring or flow limitation). The knob is broken on his  machine.  Plan: Comprehensive DME        I changed his pressure to 7-9 cm to try to reduce mouth leak. I placed the order for a replacement device again as the settings are different than the old order. We reviewed compliance goals. He was encouraged to put the mask back on when he gets up for the restroom. We reviewed that cardiovascular benefit starts at 4 hours of use per night. I will check a download from his new machine a couple of months and see if his usage is getting better and if he still appears to have mouth leak. He was encouraged to let me know if he finds the pressure change to be a problem. He was shown where he can get a new knob for his machine online.      He will follow up with me in about 1 year(s).     Twenty-five minutes spent with patient, all of which were spent face-to-face counseling, consulting, coordinating plan of care.      Bennett Goltz, PA-C    CC: No ref. provider found

## 2018-10-01 NOTE — NURSING NOTE
"Chief Complaint   Patient presents with     CPAP Follow Up       Initial /83  Pulse 62  Temp 97.5  F (36.4  C) (Oral)  Resp 16  Ht 1.753 m (5' 9\")  Wt 100.9 kg (222 lb 6.4 oz)  SpO2 95%  BMI 32.84 kg/m2 Estimated body mass index is 32.84 kg/(m^2) as calculated from the following:    Height as of this encounter: 1.753 m (5' 9\").    Weight as of this encounter: 100.9 kg (222 lb 6.4 oz).    Medication Reconciliation: complete     ESS 3  Louisa Rangel      "

## 2018-10-01 NOTE — MR AVS SNAPSHOT
After Visit Summary   10/1/2018    Ranjan Pena    MRN: [id number]           Patient Information     Date Of Birth          1940        Visit Information        Provider Department      10/1/2018 10:00 AM Goltz, Bennett Ezra, PA-C [facility]        Today's Diagnoses     DEB (obstructive sleep apnea)    -  1      Care Instructions      Your BMI is Body mass index is 32.84 kg/(m^2).  Weight management is a personal decision.  If you are interested in exploring weight loss strategies, the following discussion covers the approaches that may be successful. Body mass index (BMI) is one way to tell whether you are at a healthy weight, overweight, or obese. It measures your weight in relation to your height.  A BMI of 18.5 to 24.9 is in the healthy range. A person with a BMI of 25 to 29.9 is considered overweight, and someone with a BMI of 30 or greater is considered obese. More than two-thirds of American adults are considered overweight or obese.  Being overweight or obese increases the risk for further weight gain. Excess weight may lead to heart disease and diabetes.  Creating and following plans for healthy eating and physical activity may help you improve your health.  Weight control is part of healthy lifestyle and includes exercise, emotional health, and healthy eating habits. Careful eating habits lifelong are the mainstay of weight control. Though there are significant health benefits from weight loss, long-term weight loss with diet alone may be very difficult to achieve- studies show long-term success with dietary management in less than 10% of people. Attaining a healthy weight may be especially difficult to achieve in those with severe obesity. In some cases, medications, devices and surgical management might be considered.  What can you do?  If you are overweight or obese and are interested in methods for weight loss, you should discuss this with your provider.      Consider reducing daily calorie intake by 500 calories.     Keep a food journal.     Avoiding skipping meals, consider cutting portions instead.    Diet combined with exercise helps maintain muscle while optimizing fat loss. Strength training is particularly important for building and maintaining muscle mass. Exercise helps reduce stress, increase energy, and improves fitness. Increasing exercise without diet control, however, may not burn enough calories to loose weight.       Start walking three days a week 10-20 minutes at a time    Work towards walking thirty minutes five days a week     Eventually, increase the speed of your walking for 1-2 minutes at time    In addition, we recommend that you review healthy lifestyles and methods for weight loss available through the National Institutes of Health patient information sites:  http://win.niddk.nih.gov/publications/index.htm    And look into health and wellness programs that may be available through your health insurance provider, employer, local community center, or neeraj club.    Weight management plan: Patient was referred to their PCP to discuss a diet and exercise plan.            Follow-ups after your visit        Your next 10 appointments already scheduled     Oct 01, 2019  9:30 AM CDT   Return Sleep Patient with Bennett Ezra Goltz, PA-C   [facility] ([facility])    [address] [place] [phone]              Who to contact     If you have questions or need follow up information about today's clinic visit or your schedule please contact [facility] directly at [phone].  Normal or non-critical lab and imaging results will be communicated to you by MyChart, letter or phone within 4 business days after the clinic has received the results. If you do not hear from us within 7 days, please contact the clinic through MyChart or phone. If you have a critical or  "abnormal lab result, we will notify you by phone as soon as possible.  Submit refill requests through AuditFile or call your pharmacy and they will forward the refill request to us. Please allow 3 business days for your refill to be completed.          Additional Information About Your Visit        Care EveryWhere ID     This is your Care EveryWhere ID. This could be used by other organizations to access your [place] medical records  [id number]        Your Vitals Were     Pulse Temperature Respirations Height Pulse Oximetry BMI (Body Mass Index)    62 97.5  F (36.4  C) (Oral) 16 1.753 m (5' 9\") 95% 32.84 kg/m2       Blood Pressure from Last 3 Encounters:   10/01/18 134/83   03/19/18 126/80   09/20/17 126/78    Weight from Last 3 Encounters:   10/01/18 100.9 kg (222 lb 6.4 oz)   03/19/18 100.1 kg (220 lb 9.6 oz)   09/20/17 101.8 kg (224 lb 6.4 oz)              We Performed the Following     Comprehensive DME     Comprehensive DME        Primary Care Provider Office Phone # Fax #    Peter Nicole [phone]       [address] [place] [phone]        Equal Access to Services     ABRIL COLLINS : Hadii vangie gaviriao Soyazminali, waaxda luqadaha, qaybta kaalmada adeegyada, berhane pérez. So [facility] [phone].    ATENCIÓN: Si habla español, tiene a pratt disposición servicios gratuitos de asistencia lingüística. Llame al [phone].    We comply with applicable federal civil rights laws and [place] laws. We do not discriminate on the basis of race, color, national origin, age, disability, sex, sexual orientation, or gender identity.            Thank you!     Thank you for choosing [facility]  for your care. Our goal is always to provide you with excellent care. Hearing back from our patients is one way we can continue to improve our services. Please take a few minutes to complete the written survey that you may receive in the mail after your " visit with us. Thank you!             Your Updated Medication List - Protect others around you: Learn how to safely use, store and throw away your medicines at www.disposemymeds.org.          This list is accurate as of 10/1/18 10:23 AM.  Always use your most recent med list.                   Brand Name Dispense Instructions for use Diagnosis    albuterol 108 (90 Base) MCG/ACT inhaler    PROAIR HFA/PROVENTIL HFA/VENTOLIN HFA    1 Inhaler    Inhale 1 puff into the lungs every 4 hours as needed for shortness of breath / dyspnea or wheezing    Mild persistent asthma without complication       FLOMAX 0.4 MG capsule   Generic drug:  tamsulosin      Take 1 capsule (0.4 mg) by mouth daily    BPH (benign prostatic hypertrophy)       fluticasone 110 MCG/ACT Inhaler    FLOVENT HFA    1 Inhaler    Inhale 2 puffs into the lungs 2 times daily    Mild persistent asthma without complication       loratadine 10 MG capsule    CLARITIN    30 capsule    Take 10 mg by mouth daily. For allergies    ETD (eustachian tube dysfunction)       meclizine 25 MG tablet    ANTIVERT    30 tablet    Take 1 tablet (25 mg) by mouth 3 times daily as needed For dizziness    ETD (eustachian tube dysfunction)       order for DME     1 each    Equipment being ordered: CPAP machine + all supplies incld mask    DEB (obstructive sleep apnea)       timolol 0.5 % ophthalmic solution    TIMOPTIC          XALATAN 0.005 % ophthalmic solution   Generic drug:  latanoprost      Place 1 drop into both eyes At Bedtime

## 2018-10-11 ENCOUNTER — DOCUMENTATION ONLY (OUTPATIENT)
Dept: SLEEP MEDICINE | Facility: CLINIC | Age: 78
End: 2018-10-11

## 2018-10-11 NOTE — PROGRESS NOTES
Patient was offered choice of vendor and chose [facility].  Patient Ranjan Pena was set up at [place] on October 11, 2018. Patient received a [employer] DreamStation Auto. Pressures were set at 7-9 cm H2O.   Patient s ramp is 5 cm H2O for 20 min and FLEX/EPR is A Flex.  Patient received a Resmed Mask name: Carbajal Fx  Pillow mask Size Medium, heated tubing and heated humidifier.  Patient is not enrolled in the STM Program and does need to meet compliance. Patient will call to schedule follow up with Bennett Goltz, PA.    Jones De La Garza

## 2018-11-28 ENCOUNTER — OFFICE VISIT (OUTPATIENT)
Dept: SLEEP MEDICINE | Facility: CLINIC | Age: 78
End: 2018-11-28
Payer: MEDICARE

## 2018-11-28 VITALS
RESPIRATION RATE: 16 BRPM | WEIGHT: 218 LBS | HEART RATE: 74 BPM | SYSTOLIC BLOOD PRESSURE: 132 MMHG | BODY MASS INDEX: 32.29 KG/M2 | DIASTOLIC BLOOD PRESSURE: 78 MMHG | OXYGEN SATURATION: 92 % | HEIGHT: 69 IN

## 2018-11-28 DIAGNOSIS — G47.33 OSA (OBSTRUCTIVE SLEEP APNEA): Primary | ICD-10-CM

## 2018-11-28 PROCEDURE — 99213 OFFICE O/P EST LOW 20 MIN: CPT | Performed by: PHYSICIAN ASSISTANT

## 2018-11-28 NOTE — NURSING NOTE
"No chief complaint on file.      Initial /78  Pulse 74  Resp 16  Ht 1.753 m (5' 9\")  Wt 98.9 kg (218 lb)  SpO2 92%  BMI 32.19 kg/m2 Estimated body mass index is 32.19 kg/(m^2) as calculated from the following:    Height as of this encounter: 1.753 m (5' 9\").    Weight as of this encounter: 98.9 kg (218 lb).    Medication Reconciliation: complete    ESS 3    Jannie Hartley MA    "

## 2018-11-28 NOTE — PROGRESS NOTES
Sleep Study Follow-Up Visit:    Date on this visit: 11/28/2018    Ranjan Pena comes in today for follow-up of his CPAP use for treatment of moderate DEB and hypoxemia. He was initially seen at the [facility] for trouble keeping his CPAP mask on through the night for 18 years. His medical history is significant for DEB (initially diagnosed in 2006), asthma, glaucoma and nephrolithiasis.      His sleep study here showed:  AHI was 29.8/hr, with desaturations down to 74%. He spent 17.2 minutes below 90% SpO2 and 9.9 minutes below 89%. RDI 29.8/hr.  REM RDI 39.4/hr.  Supine RDI 59/hr.  His lateral RDI was 12/hr. Periodic Limb Movement Index 24/hour, <1/hr were associated with arousals.      He is on auto CPAP 7-9 cm. He uses a nasal pillows mask. He denies mask leak. He is comfortable with the pressures. He has not been getting as much dry mouth as he used to. He has been using the humidifier and not getting condensation on his face anymore. He got a SoClean machine. He is not aware of snoring with it. His wife is in [place] currently, so he is not observed. His energy in the day is pretty good. He naps occasionally when reading. He denies difficulty falling asleep or staying asleep.   The compliance data shows that he has used the CPAP for 29/30 nights, 90% of nights for >4 hours.  The 90th% pressure is 8.9 cm.  The average time in large leak is 0 sec.  The average nightly usage is 6:37.  The average AHI is 3/hr. He had a cold this weekend. His AHI was elevated on Monday. He was still battling the cold. The AHI was normal the rest of the month.   He will be going back to [place] in a few weeks.     Past medical/surgical history, family history, social history, medications and allergies were reviewed.      Problem List:  Patient Active Problem List    Diagnosis Date Noted     Bladder polyp 06/04/2014     Priority: Medium     DEB (obstructive sleep apnea)      Priority: Medium     Split  Night:  Sleep Study 6/9/2016 - (212.0 lbs) AHI 29.8, RDI 29.8, Supine AHI 59.0, REM AHI 47.1, Low O2% 73.7%, Time Spent ?88% 9.9, Time Spent ?89% 17.2, CPAP was titrated to a pressure of 11.       Advance Care Planning 12/20/2011     Priority: Medium     Advance Care Planning: Receipt of ACP document:  Received: Health Care Directive which was witnessed or notarized on 6-10-14.  Document previously scanned on 6-11-14.  Validation form completed and sent to be scanned.  Code Status reflects choices in most recent ACP document.  Confirmed/documented designated decision maker(s). See permanent comments section of demographics in clinical tab. View document(s) and details by clicking on code status. Added by Marilyn Downs RN, System ACP Coordinator [employer] on 7/2/2014.  Advance Care Planning: Initial facilitation introduction:   Ranjan NOLAND Pena presented for initial session regarding ACP at the clinic. He was accompanied by self. [employer] information provided and resources reviewed. He currently wishes to complete an ACP document.  He currently has the following questions or concerns about Advance Care Planning: none.  Confirmed/documented designated decision maker(s). See permanent comments section of demographics in clinical tab. Orders entered for code status to reflect current choices.Added by Nancy Fischer on 6/10/2014  Patient states has Advance Directive and will bring in a copy to clinic. 12/20/2011 Brionna Martinez           Mild persistent asthma      Priority: Medium     CARDIOVASCULAR SCREENING; LDL GOAL LESS THAN 160 10/31/2010     Priority: Medium        Impression/Plan:    (G47.33) DEB (obstructive sleep apnea)  (primary encounter diagnosis)  Comment: He is doing much better with CPAP.  Plan: Continue auto CPAP 7-9 cm. We reviewed recommendations for cleaning and replacing supplies.      He will follow up with me in about 1 year(s).     Fifteen minutes spent with patient, all of which were  spent face-to-face counseling, consulting, coordinating plan of care.      Bennett Goltz, PA-C    CC: Peter Nicole MD

## 2018-11-28 NOTE — MR AVS SNAPSHOT
After Visit Summary   11/28/2018    Ranjan Pena    MRN: [id number]           Patient Information     Date Of Birth          1940        Visit Information        Provider Department      11/28/2018 4:00 PM Goltz, Bennett Ezra, PA-C [facility]        Today's Diagnoses     DEB (obstructive sleep apnea)    -  1      Care Instructions      Your BMI is Body mass index is 32.19 kg/(m^2).  Weight management is a personal decision.  If you are interested in exploring weight loss strategies, the following discussion covers the approaches that may be successful. Body mass index (BMI) is one way to tell whether you are at a healthy weight, overweight, or obese. It measures your weight in relation to your height.  A BMI of 18.5 to 24.9 is in the healthy range. A person with a BMI of 25 to 29.9 is considered overweight, and someone with a BMI of 30 or greater is considered obese. More than two-thirds of American adults are considered overweight or obese.  Being overweight or obese increases the risk for further weight gain. Excess weight may lead to heart disease and diabetes.  Creating and following plans for healthy eating and physical activity may help you improve your health.  Weight control is part of healthy lifestyle and includes exercise, emotional health, and healthy eating habits. Careful eating habits lifelong are the mainstay of weight control. Though there are significant health benefits from weight loss, long-term weight loss with diet alone may be very difficult to achieve- studies show long-term success with dietary management in less than 10% of people. Attaining a healthy weight may be especially difficult to achieve in those with severe obesity. In some cases, medications, devices and surgical management might be considered.  What can you do?  If you are overweight or obese and are interested in methods for weight loss, you should discuss this with your provider.      Consider reducing daily calorie intake by 500 calories.     Keep a food journal.     Avoiding skipping meals, consider cutting portions instead.    Diet combined with exercise helps maintain muscle while optimizing fat loss. Strength training is particularly important for building and maintaining muscle mass. Exercise helps reduce stress, increase energy, and improves fitness. Increasing exercise without diet control, however, may not burn enough calories to loose weight.       Start walking three days a week 10-20 minutes at a time    Work towards walking thirty minutes five days a week     Eventually, increase the speed of your walking for 1-2 minutes at time    In addition, we recommend that you review healthy lifestyles and methods for weight loss available through the National Institutes of Health patient information sites:  http://win.niddk.nih.gov/publications/index.htm    And look into health and wellness programs that may be available through your health insurance provider, employer, local community center, or neeraj club.    Weight management plan: Patient was referred to their PCP to discuss a diet and exercise plan.              Follow-ups after your visit        Follow-up notes from your care team     Return in about 1 year (around 11/28/2019) for CPAP compliance recheck.      Your next 10 appointments already scheduled     Oct 01, 2019  9:30 AM CDT   Return Sleep Patient with Bennett Ezra Goltz, PA-C   [facility]    [address] [place] [id number] [phone]              Who to contact     If you have questions or need follow up information about today's clinic visit or your schedule please contact [facility] directly at [phone].  Normal or non-critical lab and imaging results will be communicated to you by MyChart, letter or phone within 4 business days after the clinic has received the results.  "If you do not hear from us within 7 days, please contact the clinic through [email or web address] or phone. If you have a critical or abnormal lab result, we will notify you by phone as soon as possible.  Submit refill requests through DabKick or call your pharmacy and they will forward the refill request to us. Please allow 3 business days for your refill to be completed.          Additional Information About Your Visit        Care EveryWhere ID     This is your Care EveryWhere ID. This could be used by other organizations to access your [place] medical records  [id number]        Your Vitals Were     Pulse Respirations Height Pulse Oximetry BMI (Body Mass Index)       74 16 1.753 m (5' 9\") 92% 32.19 kg/m2        Blood Pressure from Last 3 Encounters:   11/28/18 132/78   10/01/18 134/83   03/19/18 126/80    Weight from Last 3 Encounters:   11/28/18 98.9 kg (218 lb)   10/01/18 100.9 kg (222 lb 6.4 oz)   03/19/18 100.1 kg (220 lb 9.6 oz)              Today, you had the following     No orders found for display       Primary Care Provider Office Phone # Fax #    Peter Nicole [phone]       [address] [place] [id number]        Equal Access to Services     ABRIL COLLINS : Hadii vangie cline hadasho Soomaali, waaxda luqadaha, qaybta kaalmada adeegyada, berhane carranza hayroverto de la cruz . So [facility] [phone].    ATENCIÓN: Si habla español, tiene a pratt disposición servicios gratuitos de asistencia lingüística. Llame al [phone].    We comply with applicable federal civil rights laws and [place] laws. We do not discriminate on the basis of race, color, national origin, age, disability, sex, sexual orientation, or gender identity.            Thank you!     Thank you for choosing [facility]  for your care. Our goal is always to provide you with excellent care. Hearing back from our patients is one way we can continue to improve our services. Please take a few minutes to " complete the written survey that you may receive in the mail after your visit with us. Thank you!             Your Updated Medication List - Protect others around you: Learn how to safely use, store and throw away your medicines at www.disposemymeds.org.          This list is accurate as of 11/28/18  4:27 PM.  Always use your most recent med list.                   Brand Name Dispense Instructions for use Diagnosis    albuterol 108 (90 Base) MCG/ACT inhaler    PROAIR HFA/PROVENTIL HFA/VENTOLIN HFA    1 Inhaler    Inhale 1 puff into the lungs every 4 hours as needed for shortness of breath / dyspnea or wheezing    Mild persistent asthma without complication       FLOMAX 0.4 MG capsule   Generic drug:  tamsulosin      Take 1 capsule (0.4 mg) by mouth daily    BPH (benign prostatic hypertrophy)       fluticasone 110 MCG/ACT inhaler    FLOVENT HFA    1 Inhaler    Inhale 2 puffs into the lungs 2 times daily    Mild persistent asthma without complication       loratadine 10 MG capsule    CLARITIN    30 capsule    Take 10 mg by mouth daily. For allergies    ETD (eustachian tube dysfunction)       meclizine 25 MG tablet    ANTIVERT    30 tablet    Take 1 tablet (25 mg) by mouth 3 times daily as needed For dizziness    ETD (eustachian tube dysfunction)       order for DME     1 each    Equipment being ordered: CPAP machine + all supplies incld mask    DEB (obstructive sleep apnea)       timolol maleate 0.5 % ophthalmic solution    TIMOPTIC          XALATAN 0.005 % ophthalmic solution   Generic drug:  latanoprost      Place 1 drop into both eyes At Bedtime

## 2018-11-28 NOTE — PATIENT INSTRUCTIONS

## 2019-05-08 ENCOUNTER — TRANSFERRED RECORDS (OUTPATIENT)
Dept: HEALTH INFORMATION MANAGEMENT | Facility: CLINIC | Age: 79
End: 2019-05-08

## 2019-05-21 ENCOUNTER — OFFICE VISIT (OUTPATIENT)
Dept: INTERNAL MEDICINE | Facility: CLINIC | Age: 79
End: 2019-05-21
Payer: MEDICARE

## 2019-05-21 VITALS
RESPIRATION RATE: 16 BRPM | OXYGEN SATURATION: 94 % | HEART RATE: 68 BPM | HEIGHT: 69 IN | SYSTOLIC BLOOD PRESSURE: 118 MMHG | TEMPERATURE: 98.6 F | BODY MASS INDEX: 33.12 KG/M2 | DIASTOLIC BLOOD PRESSURE: 66 MMHG | WEIGHT: 223.6 LBS

## 2019-05-21 DIAGNOSIS — C44.719 BCC (BASAL CELL CARCINOMA), LEG, LEFT: ICD-10-CM

## 2019-05-21 DIAGNOSIS — Z48.02 VISIT FOR SUTURE REMOVAL: Primary | ICD-10-CM

## 2019-05-21 PROCEDURE — 99213 OFFICE O/P EST LOW 20 MIN: CPT | Performed by: INTERNAL MEDICINE

## 2019-05-21 RX ORDER — FINASTERIDE 5 MG/1
TABLET, FILM COATED ORAL
Refills: 3 | COMMUNITY
Start: 2019-05-08 | End: 2022-11-23

## 2019-05-21 ASSESSMENT — MIFFLIN-ST. JEOR: SCORE: 1709.62

## 2019-05-21 NOTE — PROGRESS NOTES
"Subjective     Ranjan Pena is a 79 year old male who presents to clinic today for the following health issues:    HPI   Sutures on left calf - has had in for 3 weeks  Has basal cell removed from leg in [place]     {    Reviewed and updated as needed this visit by Provider         Review of Systems         Objective    /66   Pulse 68   Temp 98.6  F (37  C) (Temporal)   Resp 16   Ht 1.753 m (5' 9\")   Wt 101.4 kg (223 lb 9.6 oz)   SpO2 94%   BMI 33.02 kg/m    Body mass index is 33.02 kg/m .  Physical Exam   GENERAL: healthy, alert and no distress  SKIN: 5 sutures on posterior lower L leg. All removed w/o complications    Labs reviewed in Epic        Assessment & Plan       ICD-10-CM    1. Visit for suture removal Z48.02    2. BCC (basal cell carcinoma), leg, left C44.719       - sutures removed  -per pt derm informed him that complete excision of BCC done and no further f/u required.     BMI:   Estimated body mass index is 33.02 kg/m  as calculated from the following:    Height as of this encounter: 1.753 m (5' 9\").    Weight as of this encounter: 101.4 kg (223 lb 9.6 oz).   Weight management plan: Discussed healthy diet and exercise guidelines            Return in about 3 months (around 8/21/2019) for Wellness Visit with labs before.    Peter Nicole MD  [facility]    "

## 2019-05-22 ASSESSMENT — ASTHMA QUESTIONNAIRES: ACT_TOTALSCORE: 19

## 2019-06-10 ENCOUNTER — OFFICE VISIT (OUTPATIENT)
Dept: INTERNAL MEDICINE | Facility: CLINIC | Age: 79
End: 2019-06-10
Payer: MEDICARE

## 2019-06-10 VITALS — DIASTOLIC BLOOD PRESSURE: 76 MMHG | TEMPERATURE: 97.9 F | SYSTOLIC BLOOD PRESSURE: 106 MMHG

## 2019-06-10 DIAGNOSIS — Z00.00 MEDICARE ANNUAL WELLNESS VISIT, SUBSEQUENT: Primary | ICD-10-CM

## 2019-06-10 DIAGNOSIS — J45.30 MILD PERSISTENT ASTHMA WITHOUT COMPLICATION: ICD-10-CM

## 2019-06-10 DIAGNOSIS — G47.33 OSA (OBSTRUCTIVE SLEEP APNEA): ICD-10-CM

## 2019-06-10 DIAGNOSIS — Z13.6 CARDIOVASCULAR SCREENING; LDL GOAL LESS THAN 160: ICD-10-CM

## 2019-06-10 LAB
ANION GAP SERPL CALCULATED.3IONS-SCNC: 6 MMOL/L (ref 3–14)
BUN SERPL-MCNC: 13 MG/DL (ref 7–30)
CALCIUM SERPL-MCNC: 8.7 MG/DL (ref 8.5–10.1)
CHLORIDE SERPL-SCNC: 108 MMOL/L (ref 94–109)
CHOLEST SERPL-MCNC: 157 MG/DL
CO2 SERPL-SCNC: 29 MMOL/L (ref 20–32)
CREAT SERPL-MCNC: 1.01 MG/DL (ref 0.66–1.25)
GFR SERPL CREATININE-BSD FRML MDRD: 69 ML/MIN/{1.73_M2}
GLUCOSE SERPL-MCNC: 98 MG/DL (ref 70–99)
HDLC SERPL-MCNC: 48 MG/DL
LDLC SERPL CALC-MCNC: 90 MG/DL
NONHDLC SERPL-MCNC: 109 MG/DL
POTASSIUM SERPL-SCNC: 4 MMOL/L (ref 3.4–5.3)
SODIUM SERPL-SCNC: 143 MMOL/L (ref 133–144)
TRIGL SERPL-MCNC: 97 MG/DL

## 2019-06-10 PROCEDURE — 80048 BASIC METABOLIC PNL TOTAL CA: CPT | Performed by: INTERNAL MEDICINE

## 2019-06-10 PROCEDURE — 80061 LIPID PANEL: CPT | Performed by: INTERNAL MEDICINE

## 2019-06-10 PROCEDURE — G0439 PPPS, SUBSEQ VISIT: HCPCS | Performed by: INTERNAL MEDICINE

## 2019-06-10 PROCEDURE — 36415 COLL VENOUS BLD VENIPUNCTURE: CPT | Performed by: INTERNAL MEDICINE

## 2019-06-10 RX ORDER — FLUTICASONE PROPIONATE 50 MCG
2 SPRAY, SUSPENSION (ML) NASAL DAILY
Qty: 16 G | Refills: 11 | COMMUNITY
Start: 2019-06-10 | End: 2020-08-24

## 2019-06-10 ASSESSMENT — ACTIVITIES OF DAILY LIVING (ADL): CURRENT_FUNCTION: NO ASSISTANCE NEEDED

## 2019-06-10 NOTE — LETTER
June 23, 2019      Ranjan Pena  [address]   [facility] [address]        Dear ,    We are writing to inform you of your test results.    Your test results fall within the expected range(s) or remain unchanged from previous results.  Please continue with current treatment plan.    Resulted Orders   Lipid panel reflex to direct LDL Fasting   Result Value Ref Range    Cholesterol 157 <200 mg/dL    Triglycerides 97 <150 mg/dL    HDL Cholesterol 48 >39 mg/dL    LDL Cholesterol Calculated 90 <100 mg/dL      Comment:      Desirable:       <100 mg/dl    Non HDL Cholesterol 109 <130 mg/dL   Basic metabolic panel   Result Value Ref Range    Sodium 143 133 - 144 mmol/L    Potassium 4.0 3.4 - 5.3 mmol/L    Chloride 108 94 - 109 mmol/L    Carbon Dioxide 29 20 - 32 mmol/L    Anion Gap 6 3 - 14 mmol/L    Glucose 98 70 - 99 mg/dL    Urea Nitrogen 13 7 - 30 mg/dL    Creatinine 1.01 0.66 - 1.25 mg/dL    GFR Estimate 69 >60 mL/min/[1.73_m2]      Comment:      Non  GFR Calc  Starting 12/18/2018, serum creatinine based estimated GFR (eGFR) will be   calculated using the Chronic Kidney Disease Epidemiology Collaboration   (CKD-EPI) equation.      GFR Estimate If Black 80 >60 mL/min/[1.73_m2]      Comment:       GFR Calc  Starting 12/18/2018, serum creatinine based estimated GFR (eGFR) will be   calculated using the Chronic Kidney Disease Epidemiology Collaboration   (CKD-EPI) equation.      Calcium 8.7 8.5 - 10.1 mg/dL       If you have any questions or concerns, please call the clinic at the number listed above.       Sincerely,        Peter Nicole MD

## 2019-06-10 NOTE — PROGRESS NOTES
"SUBJECTIVE:   Ranjan Pena is a 79 year old male who presents for Preventive Visit.    Are you in the first 12 months of your Medicare coverage?  No    Healthy Habits:    In general, how would you rate your overall health?  Good    Frequency of exercise:  4-5 days/week    Duration of exercise:  45-60 minutes    Do you usually eat at least 4 servings of fruit and vegetables a day, include whole grains    & fiber and avoid regularly eating high fat or \"junk\" foods?  Yes    Taking medications regularly:  Yes    Barriers to taking medications:  None    Medication side effects:  None    Ability to successfully perform activities of daily living:  No assistance needed    Home Safety:  No safety concerns identified    Hearing Impairment:  No hearing concerns    In the past 6 months, have you been bothered by leaking of urine? Yes (a little bit)    In general, how would you rate your overall mental or emotional health?  Very good      PHQ-2 Total Score:    Additional concerns today:  No    Do you feel safe in your environment? Yes    Do you have a Health Care Directive? Yes: Advance Directive has been received and scanned.    Question 1.  In the last 12 months: We worried food would run out before we had money to buy more. Never True    Question 2.  In the last 12 months: The food we bought just didn't last and we didn't have money to buy more. Never True    Did the patient answer Sometimes True or Often True to EITHER Question 1 or Question 2? No    Fall risk  Fallen 2 or more times in the past year?: No  Any fall with injury in the past year?: No    Cognitive Screening   1) Repeat 3 items (Leader, Season, Table)    2) Clock draw: NORMAL  3) 3 item recall: Recalls 1 object   Results: NORMAL clock, 1-2 items recalled: COGNITIVE IMPAIRMENT LESS LIKELY    Mini-CogTM Copyright S Toni. Licensed by the author for use in [facility]; reprinted with permission (napoleon@.[facility]). All rights reserved.      Do you have " sleep apnea, excessive snoring or daytime drowsiness?: yes    Reviewed and updated as needed this visit by clinical staff  Tobacco  Allergies  Meds  Med Hx  Surg Hx  Fam Hx  Soc Hx        Reviewed and updated as needed this visit by Provider        Social History     Tobacco Use     Smoking status: Former Smoker     Packs/day: 1.00     Years: 5.00     Pack years: 5.00     Types: Cigarettes     Last attempt to quit: 1965     Years since quittin.4     Smokeless tobacco: Never Used   Substance Use Topics     Alcohol use: Yes     Alcohol/week: 0.6 oz     Types: 1 Standard drinks or equivalent per week     Comment: 1 per month         Alcohol Use 2017   Prescreen: >3 drinks/day or >7 drinks/week? The patient does not drink >3 drinks per day nor >7 drinks per week.       Asthma Follow-Up    Was ACT completed today?    Yes    ACT Total Scores 6/10/2019   ACT TOTAL SCORE -   ASTHMA ER VISITS -   ASTHMA HOSPITALIZATIONS -   ACT TOTAL SCORE (Goal Greater than or Equal to 20) 20   In the past 12 months, how many times did you visit the emergency room for your asthma without being admitted to the hospital? 0   In the past 12 months, how many times were you hospitalized overnight because of your asthma? 0       How many days per week do you miss taking your asthma controller medication?  0    Please describe any recent triggers for your asthma: None    Have you had any Emergency Room Visits, Urgent Care Visits, or Hospital Admissions since your last office visit?  No        Current providers sharing in care for this patient include:   Patient Care Team:  Peter Nicole MD as PCP - General (Internal Medicine)  Peter Nicole MD as Assigned PCP    The following health maintenance items are reviewed in Epic and correct as of today:  Health Maintenance   Topic Date Due     ASTHMA ACTION PLAN  1940     ZOSTER IMMUNIZATION (1 of 2) 1988     MEDICARE ANNUAL WELLNESS VISIT  2018      "ADVANCED DIRECTIVE PLANNING  06/10/2019     ASTHMA CONTROL TEST  11/21/2019     DTAP/TDAP/TD IMMUNIZATION (3 - Td) 03/08/2026     PHQ-2  Completed     INFLUENZA VACCINE  Completed     IPV IMMUNIZATION  Aged Out     MENINGITIS IMMUNIZATION  Aged Out          Review of Systems  Constitutional, HEENT, cardiovascular, pulmonary, GI, , musculoskeletal, neuro, skin, endocrine and psych systems are negative, except as otherwise noted.    OBJECTIVE:   /76   Pulse (P) 58   Temp 97.9  F (36.6  C) (Oral)   Resp (P) 18   Wt (P) 100.2 kg (221 lb)   SpO2 (P) 97%   BMI (P) 32.64 kg/m   Estimated body mass index is 32.64 kg/m  (pended) as calculated from the following:    Height as of 5/21/19: 1.753 m (5' 9\").    Weight as of this encounter: (P) 100.2 kg (221 lb).  Physical Exam  GENERAL: alert, no distress and over weight  EYES: Eyes grossly normal to inspection, PERRL and conjunctivae and sclerae normal  HENT: ear canals and TM's normal, nose and mouth without ulcers or lesions  NECK: no adenopathy, no asymmetry, masses, or scars and thyroid normal to palpation  RESP: lungs clear to auscultation - no rales, rhonchi or wheezes  CV: regular rate and rhythm, normal S1 S2, no S3 or S4, no murmur, click or rub, no peripheral edema and peripheral pulses strong  ABDOMEN: soft, nontender, no hepatosplenomegaly, no masses and bowel sounds normal  MS: no gross musculoskeletal defects noted, no edema  SKIN: no suspicious lesions or rashes  NEURO: Normal strength and tone, mentation intact and speech normal  PSYCH: mentation appears normal, affect normal/bright  LYMPH: no cervical, supraclavicular, axillary, or inguinal adenopathy    none     ASSESSMENT / PLAN:   1. Medicare annual wellness visit, subsequent  See pt instructions    2. Mild persistent asthma without complication  - fluticasone (FLONASE) 50 MCG/ACT nasal spray; Spray 2 sprays into both nostrils daily  Dispense: 16 g; Refill: 11    3. DEB (obstructive sleep " "apnea)  Sleep medicine    4. CARDIOVASCULAR SCREENING; LDL GOAL LESS THAN 160  - Lipid panel reflex to direct LDL Fasting  - Basic metabolic panel    End of Life Planning:  Patient currently has an advanced directive: Yes.  Practitioner is supportive of decision.    COUNSELING:  Reviewed preventive health counseling, as reflected in patient instructions    Estimated body mass index is 32.64 kg/m  (pended) as calculated from the following:    Height as of 5/21/19: 1.753 m (5' 9\").    Weight as of this encounter: (P) 100.2 kg (221 lb).         reports that he quit smoking about 54 years ago. His smoking use included cigarettes. He has a 5.00 pack-year smoking history. He has never used smokeless tobacco.      Appropriate preventive services were discussed with this patient, including applicable screening as appropriate for cardiovascular disease, diabetes, osteopenia/osteoporosis, and glaucoma.  As appropriate for age/gender, discussed screening for colorectal cancer, prostate cancer, breast cancer, and cervical cancer. Checklist reviewing preventive services available has been given to the patient.    Reviewed patients plan of care and provided an AVS. The Basic Care Plan (routine screening as documented in Health Maintenance) for Ranjan meets the Care Plan requirement. This Care Plan has been established and reviewed with the Patient.    Counseling Resources:  ATP IV Guidelines  Pooled Cohorts Equation Calculator  Breast Cancer Risk Calculator  FRAX Risk Assessment  ICSI Preventive Guidelines  Dietary Guidelines for Americans, 2010  USDA's MyPlate  ASA Prophylaxis  Lung CA Screening    Peter Nicole MD  [facility]    Identified Health Risks:  "

## 2019-06-10 NOTE — PATIENT INSTRUCTIONS
Patient Education   Personalized Prevention Plan  You are due for the preventive services outlined below.  Your care team is available to assist you in scheduling these services.  If you have already completed any of these items, please share that information with your care team to update in your medical record.  Health Maintenance Due   Topic Date Due     Asthma Action Plan  1940     Zoster (Shingles) Vaccine (1 of 2) 03/23/1988     Annual Wellness Visit  09/20/2018     Discuss Advance Directive Planning  06/10/2019

## 2019-06-11 ASSESSMENT — ASTHMA QUESTIONNAIRES: ACT_TOTALSCORE: 20

## 2019-08-19 ENCOUNTER — TELEPHONE (OUTPATIENT)
Dept: UROLOGY | Facility: CLINIC | Age: 79
End: 2019-08-19

## 2019-08-19 NOTE — TELEPHONE ENCOUNTER
[facility] Call Center    Phone Message    May a detailed message be left on voicemail: yes    Reason for Call: Other: wife states PT has had prostate issues in the past and is experiencing blood in urine. PT's wife would like the PT to see Ha if possible.  Please follow up at [phone]     Action Taken: Message routed to:  [facility] ([facility] urology

## 2019-08-21 LAB — RESIDUAL VOLUME (RV) (EXTERNAL): 42

## 2019-08-22 DIAGNOSIS — R31.9 HEMATURIA: Primary | ICD-10-CM

## 2019-08-22 DIAGNOSIS — N39.43 DRIBBLING FOLLOWING URINATION: ICD-10-CM

## 2019-08-28 ENCOUNTER — OFFICE VISIT (OUTPATIENT)
Dept: UROLOGY | Facility: CLINIC | Age: 79
End: 2019-08-28
Payer: MEDICARE

## 2019-08-28 VITALS
BODY MASS INDEX: 32.29 KG/M2 | DIASTOLIC BLOOD PRESSURE: 76 MMHG | OXYGEN SATURATION: 93 % | HEART RATE: 68 BPM | WEIGHT: 218 LBS | HEIGHT: 69 IN | SYSTOLIC BLOOD PRESSURE: 116 MMHG

## 2019-08-28 DIAGNOSIS — N39.43 DRIBBLING FOLLOWING URINATION: ICD-10-CM

## 2019-08-28 DIAGNOSIS — R31.9 HEMATURIA, UNSPECIFIED TYPE: ICD-10-CM

## 2019-08-28 LAB
ALBUMIN UR-MCNC: NEGATIVE MG/DL
APPEARANCE UR: CLEAR
BILIRUB UR QL STRIP: NEGATIVE
COLOR UR AUTO: YELLOW
GLUCOSE UR STRIP-MCNC: NEGATIVE MG/DL
HGB UR QL STRIP: NEGATIVE
KETONES UR STRIP-MCNC: NEGATIVE MG/DL
LEUKOCYTE ESTERASE UR QL STRIP: NEGATIVE
NITRATE UR QL: NEGATIVE
PH UR STRIP: 6 PH (ref 5–7)
SOURCE: NORMAL
SP GR UR STRIP: 1.01 (ref 1–1.03)
UROBILINOGEN UR STRIP-ACNC: 0.2 EU/DL (ref 0.2–1)

## 2019-08-28 PROCEDURE — 51798 US URINE CAPACITY MEASURE: CPT | Performed by: UROLOGY

## 2019-08-28 PROCEDURE — 99203 OFFICE O/P NEW LOW 30 MIN: CPT | Mod: 25 | Performed by: UROLOGY

## 2019-08-28 PROCEDURE — 81003 URINALYSIS AUTO W/O SCOPE: CPT | Performed by: UROLOGY

## 2019-08-28 ASSESSMENT — PAIN SCALES - GENERAL: PAINLEVEL: NO PAIN (0)

## 2019-08-28 ASSESSMENT — MIFFLIN-ST. JEOR: SCORE: 1676.28

## 2019-08-28 NOTE — Clinical Note
Pasha Nicole,I saw Ranjan today in consultation for his possible hematuria.  There was no evidence of microscopic hematuria on our lab test today, and given his unclear history of gross hematuria we discussed that observation at this point is the most reasonable option.  He will contact her office if this worsens and will plan to follow-up with his primary urologist on his return to [place] in October.Thanks, Everardo Zhang M.D.Cell: [phone]

## 2019-08-28 NOTE — LETTER
"8/28/2019       RE: Ranjan Pena  [address]  [facility] [address]     Dear Colleague,    Thank you for referring your patient, Ranjan Pena, to the [facility] [place] at [facility]. Please see a copy of my visit note below.    Urology Consult History and Physical  YUE  Name: Ranjan Pena    MRN: [id number]   YOB: 1940       We were asked to see Ranjan Pena at the request of Dr. Nicole for evaluation and treatment of nocturia, possible hematuria.        Chief Complaint:   Nocturia, possible hematuria    History is obtained from the patient            History of Present Illness:   Ranjan Pena is a 79 year old male who is being seen for evaluation of nocturnal dribbling and possible hematuria.     He had some sporadic episodes of leakage of a small amount of urine overnight which leaves a \"brownish/redish tinge\" to the sheets. Happens 7-8x/month for the past 4 months.   No prior history of gross hematuria.   This has not occurred for the past 4-5 nights.     On tamsulosin 0.4mg (Flomax) and finasteride 5mg (Proscar)     Follows with a Urologist in [place] - he sees him twice a year. Dr. Rivera.     PSA from his office:  4/23/18     2.95  4/19/17     3.79  4/18/16     2.92    AUASS: 0-6-8-4-2-0-0 = 9  QOL = 1  PVR = 42 ml     (4 or >)  Location: Urine  Quality: nocturnal dribbling  Severity: possible hematuria   Duration: 4 months           Past Medical History:     Past Medical History:   Diagnosis Date     Glaucoma      Intermittent asthma      Nephrolithiasis 2010     DEB (obstructive sleep apnea)             Past Surgical History:     Past Surgical History:   Procedure Laterality Date     CHOLECYSTECTOMY, OPEN  2006     EYE SURGERY      cornea      ROTATOR CUFF REPAIR RT/LT  2000    LT            Social History:     Social History     Tobacco Use     Smoking status: Former Smoker     " Packs/day: 1.00     Years: 5.00     Pack years: 5.00     Types: Cigarettes     Last attempt to quit: 1965     Years since quittin.6     Smokeless tobacco: Never Used   Substance Use Topics     Alcohol use: Yes     Alcohol/week: 0.6 oz     Types: 1 Standard drinks or equivalent per week     Comment: 1 per month       History   Smoking Status     Former Smoker     Packs/day: 1.00     Years: 5.00     Types: Cigarettes     Quit date: 1965   Smokeless Tobacco     Never Used            Family History:     Family History   Problem Relation Age of Onset     Cancer Father         bladder cancer     Family History Negative Mother      Cancer Sister               Allergies:   No Known Allergies         Medications:     Current Outpatient Medications   Medication Sig     albuterol (PROAIR HFA/PROVENTIL HFA/VENTOLIN HFA) 108 (90 BASE) MCG/ACT Inhaler Inhale 1 puff into the lungs every 4 hours as needed for shortness of breath / dyspnea or wheezing     finasteride (PROSCAR) 5 MG tablet TK 1 T PO QD AT THE ZULMA MEAL     fluticasone (FLONASE) 50 MCG/ACT nasal spray Spray 2 sprays into both nostrils daily     fluticasone (FLOVENT HFA) 110 MCG/ACT Inhaler Inhale 2 puffs into the lungs 2 times daily     latanoprost (XALATAN) 0.005 % ophthalmic solution Place 1 drop into both eyes At Bedtime     Loratadine (CLARITIN) 10 MG capsule Take 10 mg by mouth daily. For allergies     meclizine (ANTIVERT) 25 MG tablet Take 1 tablet (25 mg) by mouth 3 times daily as needed For dizziness     omeprazole (PRILOSEC) 20 MG DR capsule TK 1 C PO ONCE D     tamsulosin (FLOMAX) 0.4 MG 24 hr capsule Take 1 capsule (0.4 mg) by mouth daily     timolol (TIMOPTIC) 0.5 % ophthalmic solution      order for DME Equipment being ordered: CPAP machine + all supplies incld mask (Patient not taking: Reported on 2019)     No current facility-administered medications for this visit.              Review of Systems:     Skin: negative  Eyes:  "negative  Ears/Nose/Throat: negative  Respiratory: No shortness of breath, dyspnea on exertion, cough, or hemoptysis  Cardiovascular: negative  Gastrointestinal: negative  Genitourinary: as above  Musculoskeletal: negative  Neurologic: negative  Psychiatric: negative  Hematologic/Lymphatic/Immunologic: negative  Endocrine: negative          Physical Exam:     Patient Vitals for the past 24 hrs:   BP Pulse SpO2 Height Weight   08/28/19 1055 116/76 68 93 % 1.74 m (5' 8.5\") 98.9 kg (218 lb)     Body mass index is 32.66 kg/m .     General: age-appropriate appearing male in NAD  HEENT: Head AT/NC, EOMI, CN Grossly intact  Lungs: no respiratory distress, or pursed lip breathing  Heart: No obvious jugular venous distension present  Back: no bony midline tenderness, no CVAT bilaterally.  Abdomen: soft, non-distended, non-tender. No organomegaly  : No costovertebral tenderness bilaterally   Rectal: Recently done with primary urologist  Lymph: no palpable inguinal lymphadenopathy.  LE: no edema.   Musculoskeltal: extremities normal, no peripheral edema  Skin: no suspicious lesions or rashes  Neuro: Alert, oriented, speech and mentation normal;  moving all 4 extremities equally.  Psych: affect and mood normal          Data:   All laboratory data reviewed:    UA RESULTS:  Recent Labs   Lab Test 08/28/19  1052 06/28/13  1344   COLOR Yellow Yellow   APPEARANCE Clear Clear   URINEGLC Negative Negative   URINEBILI Negative Negative   URINEKETONE Negative Negative   SG 1.010 <=1.005   UBLD Negative Trace*   URINEPH 6.0 5.0   PROTEIN Negative Negative   UROBILINOGEN 0.2 0.2   NITRITE Negative Negative   LEUKEST Negative Negative   RBCU  --  O - 2   WBCU  --  O - 2      PSA   Date Value Ref Range Status   06/07/2012 2.32 0 - 4 ug/L Final      Lab Results   Component Value Date    CR 1.01 06/10/2019             Impression and Plan:   Impression:   79 year old man with long history of BPH with LUTS now with 4 months of nocturnal " dribbling and possible hematuria      Plan:   Nocturnal dribbling and possible hematuria  -We discussed that this time there is no laboratory evidence or clear evidence of hematuria, either gross or microscopic.  It is unclear if the staining of his sheets may just be due to normal urine without blood.  -We discussed that if hematuria were to be confirmed, then a hematuria work-up would be warranted with a urine cytology, a CT urogram, and cystoscopy.  -He will be returning to [place] in October and will be seeing his primary urologist at that time.  I advised that he discuss this with his urologist  -We discussed that if this continues or worsens in the next few months while he is here that he should contact our office for possible hematuria work-up    BPH with LUTS  -He is doing well on dual medical management     Thank you for the kind consultation.    Time spent: 30 minutes of which >50% was spent counseling.    Everardo Zhang MD   Urology  [facility]  [facility] Phone: [phone]  [facility] Phone: [phone]

## 2019-08-28 NOTE — PROGRESS NOTES
"Urology Consult History and Physical  [facility]  Name: Ranjan Pena    MRN: [id number]   YOB: 1940       We were asked to see Ranjan Pena at the request of Dr. Nicole for evaluation and treatment of nocturia, possible hematuria.        Chief Complaint:   Nocturia, possible hematuria    History is obtained from the patient            History of Present Illness:   Ranjan Pena is a 79 year old male who is being seen for evaluation of nocturnal dribbling and possible hematuria.     He had some sporadic episodes of leakage of a small amount of urine overnight which leaves a \"brownish/redish tinge\" to the sheets. Happens 7-8x/month for the past 4 months.   No prior history of gross hematuria.   This has not occurred for the past 4-5 nights.     On tamsulosin 0.4mg (Flomax) and finasteride 5mg (Proscar)     Follows with a Urologist in [place] - he sees him twice a year. Dr. Rivera.     PSA from his office:  18     2.95  17     3.79  16     2.92    AUASS: 2-5-2-4-2-0-0 = 9  QOL = 1  PVR = 42 ml     (4 or >)  Location: Urine  Quality: nocturnal dribbling  Severity: possible hematuria   Duration: 4 months           Past Medical History:     Past Medical History:   Diagnosis Date     Glaucoma      Intermittent asthma      Nephrolithiasis      DEB (obstructive sleep apnea)             Past Surgical History:     Past Surgical History:   Procedure Laterality Date     CHOLECYSTECTOMY, OPEN  2006     EYE SURGERY      cornea      ROTATOR CUFF REPAIR RT/LT      LT            Social History:     Social History     Tobacco Use     Smoking status: Former Smoker     Packs/day: 1.00     Years: 5.00     Pack years: 5.00     Types: Cigarettes     Last attempt to quit: 1965     Years since quittin.6     Smokeless tobacco: Never Used   Substance Use Topics     Alcohol use: Yes     Alcohol/week: 0.6 oz     Types: 1 Standard drinks or equivalent per week     Comment: 1 per month "       History   Smoking Status     Former Smoker     Packs/day: 1.00     Years: 5.00     Types: Cigarettes     Quit date: 1/1/1965   Smokeless Tobacco     Never Used            Family History:     Family History   Problem Relation Age of Onset     Cancer Father         bladder cancer     Family History Negative Mother      Cancer Sister               Allergies:   No Known Allergies         Medications:     Current Outpatient Medications   Medication Sig     albuterol (PROAIR HFA/PROVENTIL HFA/VENTOLIN HFA) 108 (90 BASE) MCG/ACT Inhaler Inhale 1 puff into the lungs every 4 hours as needed for shortness of breath / dyspnea or wheezing     finasteride (PROSCAR) 5 MG tablet TK 1 T PO QD AT THE ZULMA MEAL     fluticasone (FLONASE) 50 MCG/ACT nasal spray Spray 2 sprays into both nostrils daily     fluticasone (FLOVENT HFA) 110 MCG/ACT Inhaler Inhale 2 puffs into the lungs 2 times daily     latanoprost (XALATAN) 0.005 % ophthalmic solution Place 1 drop into both eyes At Bedtime     Loratadine (CLARITIN) 10 MG capsule Take 10 mg by mouth daily. For allergies     meclizine (ANTIVERT) 25 MG tablet Take 1 tablet (25 mg) by mouth 3 times daily as needed For dizziness     omeprazole (PRILOSEC) 20 MG DR capsule TK 1 C PO ONCE D     tamsulosin (FLOMAX) 0.4 MG 24 hr capsule Take 1 capsule (0.4 mg) by mouth daily     timolol (TIMOPTIC) 0.5 % ophthalmic solution      order for DME Equipment being ordered: CPAP machine + all supplies incld mask (Patient not taking: Reported on 8/28/2019)     No current facility-administered medications for this visit.              Review of Systems:     Skin: negative  Eyes: negative  Ears/Nose/Throat: negative  Respiratory: No shortness of breath, dyspnea on exertion, cough, or hemoptysis  Cardiovascular: negative  Gastrointestinal: negative  Genitourinary: as above  Musculoskeletal: negative  Neurologic: negative  Psychiatric: negative  Hematologic/Lymphatic/Immunologic: negative  Endocrine:  "negative          Physical Exam:     Patient Vitals for the past 24 hrs:   BP Pulse SpO2 Height Weight   08/28/19 1055 116/76 68 93 % 1.74 m (5' 8.5\") 98.9 kg (218 lb)     Body mass index is 32.66 kg/m .     General: age-appropriate appearing male in NAD  HEENT: Head AT/NC, EOMI, CN Grossly intact  Lungs: no respiratory distress, or pursed lip breathing  Heart: No obvious jugular venous distension present  Back: no bony midline tenderness, no CVAT bilaterally.  Abdomen: soft, non-distended, non-tender. No organomegaly  : No costovertebral tenderness bilaterally   Rectal: Recently done with primary urologist  Lymph: no palpable inguinal lymphadenopathy.  LE: no edema.   Musculoskeltal: extremities normal, no peripheral edema  Skin: no suspicious lesions or rashes  Neuro: Alert, oriented, speech and mentation normal;  moving all 4 extremities equally.  Psych: affect and mood normal          Data:   All laboratory data reviewed:    UA RESULTS:  Recent Labs   Lab Test 08/28/19  1052 06/28/13  1344   COLOR Yellow Yellow   APPEARANCE Clear Clear   URINEGLC Negative Negative   URINEBILI Negative Negative   URINEKETONE Negative Negative   SG 1.010 <=1.005   UBLD Negative Trace*   URINEPH 6.0 5.0   PROTEIN Negative Negative   UROBILINOGEN 0.2 0.2   NITRITE Negative Negative   LEUKEST Negative Negative   RBCU  --  O - 2   WBCU  --  O - 2      PSA   Date Value Ref Range Status   06/07/2012 2.32 0 - 4 ug/L Final      Lab Results   Component Value Date    CR 1.01 06/10/2019             Impression and Plan:   Impression:   79 year old man with long history of BPH with LUTS now with 4 months of nocturnal dribbling and possible hematuria      Plan:   Nocturnal dribbling and possible hematuria  -We discussed that this time there is no laboratory evidence or clear evidence of hematuria, either gross or microscopic.  It is unclear if the staining of his sheets may just be due to normal urine without blood.  -We discussed that if " hematuria were to be confirmed, then a hematuria work-up would be warranted with a urine cytology, a CT urogram, and cystoscopy.  -He will be returning to [place] in October and will be seeing his primary urologist at that time.  I advised that he discuss this with his urologist  -We discussed that if this continues or worsens in the next few months while he is here that he should contact our office for possible hematuria work-up    BPH with LUTS  -He is doing well on dual medical management     Thank you for the kind consultation.    Time spent: 30 minutes of which >50% was spent counseling.    Everardo Zhang MD   Urology  [facility]  [facility] Phone: [phone]  [facility] Phone: [phone]

## 2019-08-28 NOTE — NURSING NOTE
Chief Complaint   Patient presents with     Clinic Care Coordination - Follow-up     Pt here for hematuria and dribbling     PVR is 42mL  Ingrid Samuel CMA

## 2019-10-09 ENCOUNTER — OFFICE VISIT (OUTPATIENT)
Dept: SLEEP MEDICINE | Facility: CLINIC | Age: 79
End: 2019-10-09
Payer: MEDICARE

## 2019-10-09 VITALS
HEART RATE: 71 BPM | DIASTOLIC BLOOD PRESSURE: 68 MMHG | HEIGHT: 69 IN | RESPIRATION RATE: 16 BRPM | BODY MASS INDEX: 32.73 KG/M2 | OXYGEN SATURATION: 94 % | SYSTOLIC BLOOD PRESSURE: 118 MMHG | WEIGHT: 221 LBS

## 2019-10-09 DIAGNOSIS — G47.33 OBSTRUCTIVE SLEEP APNEA (ADULT) (PEDIATRIC): Primary | ICD-10-CM

## 2019-10-09 DIAGNOSIS — G47.33 OSA (OBSTRUCTIVE SLEEP APNEA): Primary | ICD-10-CM

## 2019-10-09 PROCEDURE — 99214 OFFICE O/P EST MOD 30 MIN: CPT | Performed by: PHYSICIAN ASSISTANT

## 2019-10-09 ASSESSMENT — MIFFLIN-ST. JEOR: SCORE: 1689.89

## 2019-10-09 NOTE — NURSING NOTE
"Chief Complaint   Patient presents with     CPAP Follow Up       Initial /68   Pulse 71   Resp 16   Ht 1.74 m (5' 8.5\")   Wt 100.2 kg (221 lb)   SpO2 94%   BMI 33.11 kg/m   Estimated body mass index is 33.11 kg/m  as calculated from the following:    Height as of this encounter: 1.74 m (5' 8.5\").    Weight as of this encounter: 100.2 kg (221 lb).    Medication Reconciliation: complete     ESS 3  Louisa Rangel MA      "

## 2019-10-09 NOTE — PATIENT INSTRUCTIONS
Your BMI is Body mass index is 33.11 kg/m .  Weight management is a personal decision.  If you are interested in exploring weight loss strategies, the following discussion covers the approaches that may be successful. Body mass index (BMI) is one way to tell whether you are at a healthy weight, overweight, or obese. It measures your weight in relation to your height.  A BMI of 18.5 to 24.9 is in the healthy range. A person with a BMI of 25 to 29.9 is considered overweight, and someone with a BMI of 30 or greater is considered obese. More than two-thirds of American adults are considered overweight or obese.  Being overweight or obese increases the risk for further weight gain. Excess weight may lead to heart disease and diabetes.  Creating and following plans for healthy eating and physical activity may help you improve your health.  Weight control is part of healthy lifestyle and includes exercise, emotional health, and healthy eating habits. Careful eating habits lifelong are the mainstay of weight control. Though there are significant health benefits from weight loss, long-term weight loss with diet alone may be very difficult to achieve- studies show long-term success with dietary management in less than 10% of people. Attaining a healthy weight may be especially difficult to achieve in those with severe obesity. In some cases, medications, devices and surgical management might be considered.  What can you do?  If you are overweight or obese and are interested in methods for weight loss, you should discuss this with your provider.     Consider reducing daily calorie intake by 500 calories.     Keep a food journal.     Avoiding skipping meals, consider cutting portions instead.    Diet combined with exercise helps maintain muscle while optimizing fat loss. Strength training is particularly important for building and maintaining muscle mass. Exercise helps reduce stress, increase energy, and improves fitness.  Increasing exercise without diet control, however, may not burn enough calories to loose weight.       Start walking three days a week 10-20 minutes at a time    Work towards walking thirty minutes five days a week     Eventually, increase the speed of your walking for 1-2 minutes at time    In addition, we recommend that you review healthy lifestyles and methods for weight loss available through the National Institutes of Health patient information sites:  http://win.niddk.nih.gov/publications/index.htm    And look into health and wellness programs that may be available through your health insurance provider, employer, local community center, or neeraj club.    Weight management plan: Patient was referred to their PCP to discuss a diet and exercise plan.

## 2019-10-09 NOTE — PROGRESS NOTES
CPAP Follow-Up Visit:    Date on this visit: 10/9/2019    Ranjan Pena comes in today for follow-up of his CPAP use for treatment of moderate DEB and hypoxemia. He was initially seen at the [facility] for trouble keeping his CPAP mask on through the night for 18 years. His medical history is significant for DEB (initially diagnosed in 2006), asthma, glaucoma and nephrolithiasis.      His sleep study here showed:  AHI was 29.8/hr, with desaturations down to 74%. He spent 17.2 minutes below 90% SpO2 and 9.9 minutes below 89%. RDI 29.8/hr.  REM RDI 39.4/hr.  Supine RDI 59/hr.  His lateral RDI was 12/hr. Periodic Limb Movement Index 24/hour, <1/hr were associated with arousals.      He is on auto CPAP 7-9 cm. He uses a nasal pillows mask. He sometimes wakes with moisture on his face. That may be more sweating rather than from the mask. He often sleeps with just a sheet. He does not notice the air temperature feeling warm. He denies dry mouth in the morning. His wife has not complained about him snoring. He is comfortable with the pressure.    Bedtime is 10 PM in the last month. Prior to that, he was going to bed closer to 11 PM. He falls asleep in about 15-20 minutes. He is up around 6 AM. He wakes about twice per night. He sometimes has difficulty falling back to sleep. His cat often wakes him around 4 AM. He woke less when going to bed at 11 PM. His energy has been a little low lately. He may doze while reading. He does not know if that helps his energy for the rest of the day. He does not usually take planned naps. He gets 10,000 steps per day.     The compliance data shows that he has used the CPAP for 30/30 nights, 100% of nights for >4 hours.  The 90th% pressure is 9 cm.  The average time in large leak is 2 sec.  The average nightly usage is 7:25.  The average AHI is 5/hr (1.1/hr are centrals).    His weight has been relatively stable in the last year.      Past medical/surgical history, family  history, social history, medications and allergies were reviewed.      Problem List:  Patient Active Problem List    Diagnosis Date Noted     DEB (obstructive sleep apnea)      Priority: Medium     Split Night:  Sleep Study 6/9/2016 - (212.0 lbs) AHI 29.8, RDI 29.8, Supine AHI 59.0, REM AHI 47.1, Low O2% 73.7%, Time Spent ?88% 9.9, Time Spent ?89% 17.2, CPAP was titrated to a pressure of 11.       Advance Care Planning 12/20/2011     Priority: Medium     Advance Care Planning: Receipt of ACP document:  Received: Health Care Directive which was witnessed or notarized on 6-10-14.  Document previously scanned on 6-11-14.  Validation form completed and sent to be scanned.  Code Status reflects choices in most recent ACP document.  Confirmed/documented designated decision maker(s). See permanent comments section of demographics in clinical tab. View document(s) and details by clicking on code status. Added by Marilyn Downs RN, System ACP Coordinator [employer] on 7/2/2014.  Advance Care Planning: Initial facilitation introduction:   Rajnan Pena presented for initial session regarding ACP at the clinic. He was accompanied by self. [employer] information provided and resources reviewed. He currently wishes to complete an ACP document.  He currently has the following questions or concerns about Advance Care Planning: none.  Confirmed/documented designated decision maker(s). See permanent comments section of demographics in clinical tab. Orders entered for code status to reflect current choices.Added by Nancy Fischer on 6/10/2014  Patient states has Advance Directive and will bring in a copy to clinic. 12/20/2011 Brionna Martinez           Mild persistent asthma      Priority: Medium     CARDIOVASCULAR SCREENING; LDL GOAL LESS THAN 160 10/31/2010     Priority: Medium        Impression/Plan:    (G47.33) DEB (obstructive sleep apnea)  (primary encounter diagnosis)  Comment: His AHI is 5/hr. He has been a little  more tired, going to bed a little earlier, but then having occasional difficulty getting back to sleep in the middle of the night.   Plan: Comprehensive DME        Changed pressures to 7-10 cm. A prescription was written for new supplies. He was advised to clean the filter more regularly. He was shown how to adjust the humidifier. He was told he could try to add a short nap in the early afternoon if it helps his energy without interfering with his nighttime sleep. If having more prolonged awakenings, he should push his bedtime back to 11 PM. Try to stay active in the daytime and get bright light exposure.      He will follow up with me in about 1 year(s).     Twenty-five minutes spent with patient, all of which were spent face-to-face counseling, consulting, coordinating plan of care.      Bennett Goltz, PA-C    CC: No ref. provider found

## 2020-08-24 ENCOUNTER — ANCILLARY PROCEDURE (OUTPATIENT)
Dept: GENERAL RADIOLOGY | Facility: CLINIC | Age: 80
End: 2020-08-24
Attending: INTERNAL MEDICINE
Payer: MEDICARE

## 2020-08-24 ENCOUNTER — OFFICE VISIT (OUTPATIENT)
Dept: INTERNAL MEDICINE | Facility: CLINIC | Age: 80
End: 2020-08-24
Payer: MEDICARE

## 2020-08-24 VITALS
WEIGHT: 213.9 LBS | HEART RATE: 62 BPM | HEIGHT: 70 IN | SYSTOLIC BLOOD PRESSURE: 118 MMHG | TEMPERATURE: 98.2 F | BODY MASS INDEX: 30.62 KG/M2 | DIASTOLIC BLOOD PRESSURE: 70 MMHG | OXYGEN SATURATION: 97 %

## 2020-08-24 DIAGNOSIS — J45.30 MILD PERSISTENT ASTHMA WITHOUT COMPLICATION: ICD-10-CM

## 2020-08-24 DIAGNOSIS — Z13.6 CARDIOVASCULAR SCREENING; LDL GOAL LESS THAN 160: ICD-10-CM

## 2020-08-24 DIAGNOSIS — M25.552 HIP PAIN, LEFT: ICD-10-CM

## 2020-08-24 DIAGNOSIS — Z00.00 MEDICARE ANNUAL WELLNESS VISIT, SUBSEQUENT: Primary | ICD-10-CM

## 2020-08-24 DIAGNOSIS — J45.20 MILD INTERMITTENT ASTHMA WITHOUT COMPLICATION: ICD-10-CM

## 2020-08-24 LAB
ANION GAP SERPL CALCULATED.3IONS-SCNC: 3 MMOL/L (ref 3–14)
BUN SERPL-MCNC: 14 MG/DL (ref 7–30)
CALCIUM SERPL-MCNC: 8.9 MG/DL (ref 8.5–10.1)
CHLORIDE SERPL-SCNC: 109 MMOL/L (ref 94–109)
CHOLEST SERPL-MCNC: 157 MG/DL
CO2 SERPL-SCNC: 27 MMOL/L (ref 20–32)
CREAT SERPL-MCNC: 1.06 MG/DL (ref 0.66–1.25)
GFR SERPL CREATININE-BSD FRML MDRD: 65 ML/MIN/{1.73_M2}
GLUCOSE SERPL-MCNC: 99 MG/DL (ref 70–99)
HDLC SERPL-MCNC: 43 MG/DL
LDLC SERPL CALC-MCNC: 92 MG/DL
NONHDLC SERPL-MCNC: 114 MG/DL
POTASSIUM SERPL-SCNC: 4.1 MMOL/L (ref 3.4–5.3)
SODIUM SERPL-SCNC: 139 MMOL/L (ref 133–144)
TRIGL SERPL-MCNC: 111 MG/DL

## 2020-08-24 PROCEDURE — 36415 COLL VENOUS BLD VENIPUNCTURE: CPT | Performed by: INTERNAL MEDICINE

## 2020-08-24 PROCEDURE — 73502 X-RAY EXAM HIP UNI 2-3 VIEWS: CPT

## 2020-08-24 PROCEDURE — 80061 LIPID PANEL: CPT | Performed by: INTERNAL MEDICINE

## 2020-08-24 PROCEDURE — 80048 BASIC METABOLIC PNL TOTAL CA: CPT | Performed by: INTERNAL MEDICINE

## 2020-08-24 PROCEDURE — G0439 PPPS, SUBSEQ VISIT: HCPCS | Performed by: INTERNAL MEDICINE

## 2020-08-24 PROCEDURE — 99213 OFFICE O/P EST LOW 20 MIN: CPT | Mod: 25 | Performed by: INTERNAL MEDICINE

## 2020-08-24 RX ORDER — ALBUTEROL SULFATE 90 UG/1
1-2 AEROSOL, METERED RESPIRATORY (INHALATION) EVERY 4 HOURS PRN
Qty: 1 INHALER | Refills: 11 | Status: SHIPPED | OUTPATIENT
Start: 2020-08-24 | End: 2022-12-13

## 2020-08-24 ASSESSMENT — ENCOUNTER SYMPTOMS
PARESTHESIAS: 0
PALPITATIONS: 0
CONSTIPATION: 0
NAUSEA: 0
JOINT SWELLING: 0
SORE THROAT: 0
EYE PAIN: 0
DIARRHEA: 0
ARTHRALGIAS: 1
DIZZINESS: 0
SHORTNESS OF BREATH: 0
COUGH: 0
HEARTBURN: 0
CHILLS: 0
HEADACHES: 0
WEAKNESS: 0
HEMATOCHEZIA: 0
ABDOMINAL PAIN: 0
FREQUENCY: 1
DYSURIA: 0
NERVOUS/ANXIOUS: 0
FEVER: 0
MYALGIAS: 0

## 2020-08-24 ASSESSMENT — ASTHMA QUESTIONNAIRES
QUESTION_5 LAST FOUR WEEKS HOW WOULD YOU RATE YOUR ASTHMA CONTROL: WELL CONTROLLED
QUESTION_4 LAST FOUR WEEKS HOW OFTEN HAVE YOU USED YOUR RESCUE INHALER OR NEBULIZER MEDICATION (SUCH AS ALBUTEROL): NOT AT ALL
QUESTION_2 LAST FOUR WEEKS HOW OFTEN HAVE YOU HAD SHORTNESS OF BREATH: NOT AT ALL
QUESTION_3 LAST FOUR WEEKS HOW OFTEN DID YOUR ASTHMA SYMPTOMS (WHEEZING, COUGHING, SHORTNESS OF BREATH, CHEST TIGHTNESS OR PAIN) WAKE YOU UP AT NIGHT OR EARLIER THAN USUAL IN THE MORNING: NOT AT ALL
QUESTION_1 LAST FOUR WEEKS HOW MUCH OF THE TIME DID YOUR ASTHMA KEEP YOU FROM GETTING AS MUCH DONE AT WORK, SCHOOL OR AT HOME: NONE OF THE TIME
ACT_TOTALSCORE: 24

## 2020-08-24 ASSESSMENT — MIFFLIN-ST. JEOR: SCORE: 1676.49

## 2020-08-24 ASSESSMENT — ACTIVITIES OF DAILY LIVING (ADL): CURRENT_FUNCTION: NO ASSISTANCE NEEDED

## 2020-08-24 NOTE — LETTER
September 2, 2020      Ranjan Pena  [address]   [facility] [address]        Dear ,    We are writing to inform you of your test results.    Your lab results are normal.      Resulted Orders   Lipid panel reflex to direct LDL Fasting   Result Value Ref Range    Cholesterol 157 <200 mg/dL    Triglycerides 111 <150 mg/dL      Comment:      Fasting specimen    HDL Cholesterol 43 >39 mg/dL    LDL Cholesterol Calculated 92 <100 mg/dL      Comment:      Desirable:       <100 mg/dl    Non HDL Cholesterol 114 <130 mg/dL   Basic metabolic panel   Result Value Ref Range    Sodium 139 133 - 144 mmol/L    Potassium 4.1 3.4 - 5.3 mmol/L    Chloride 109 94 - 109 mmol/L    Carbon Dioxide 27 20 - 32 mmol/L    Anion Gap 3 3 - 14 mmol/L    Glucose 99 70 - 99 mg/dL      Comment:      Fasting specimen    Urea Nitrogen 14 7 - 30 mg/dL    Creatinine 1.06 0.66 - 1.25 mg/dL    GFR Estimate 65 >60 mL/min/[1.73_m2]      Comment:      Non  GFR Calc  Starting 12/18/2018, serum creatinine based estimated GFR (eGFR) will be   calculated using the Chronic Kidney Disease Epidemiology Collaboration   (CKD-EPI) equation.      GFR Estimate If Black 75 >60 mL/min/[1.73_m2]      Comment:       GFR Calc  Starting 12/18/2018, serum creatinine based estimated GFR (eGFR) will be   calculated using the Chronic Kidney Disease Epidemiology Collaboration   (CKD-EPI) equation.      Calcium 8.9 8.5 - 10.1 mg/dL       If you have any questions or concerns, please call the clinic at the number listed above.       Sincerely,        Peter Nicole MD

## 2020-08-24 NOTE — PROGRESS NOTES
"SUBJECTIVE:   Ranjan Pena is a 80 year old male who presents for Preventive Visit.    Are you in the first 12 months of your Medicare coverage?  No    Healthy Habits:     In general, how would you rate your overall health?  Excellent    Frequency of exercise:  1 day/week    Duration of exercise:  15-30 minutes    Do you usually eat at least 4 servings of fruit and vegetables a day, include whole grains    & fiber and avoid regularly eating high fat or \"junk\" foods?  Yes    Taking medications regularly:  Yes    Medication side effects:  None    Ability to successfully perform activities of daily living:  No assistance needed    Home Safety:  No safety concerns identified    Hearing Impairment:  No hearing concerns    In the past 6 months, have you been bothered by leaking of urine?  No    In general, how would you rate your overall mental or emotional health?  Good      PHQ-2 Total Score: 0    Additional concerns today:  Yes    Do you feel safe in your environment? No    Have you ever done Advance Care Planning? (For example, a Health Directive, POLST, or a discussion with a medical provider or your loved ones about your wishes): Yes, advance care planning is on file.    Fall risk  Fallen 2 or more times in the past year?: No  Any fall with injury in the past year?: No    Cognitive Screening   1) Repeat 3 items (Leader, Season, Table)    2) Clock draw: NORMAL  3) 3 item recall: Recalls 3 objects  Results: 3 items recalled: COGNITIVE IMPAIRMENT LESS LIKELY    Mini-CogTM Copyright RE Muñoz. Licensed by the author for use in [facility]; reprinted with permission (napoleon@.[facility]). All rights reserved.      Do you have sleep apnea, excessive snoring or daytime drowsiness?: yes    Reviewed and updated as needed this visit by clinical staff  Tobacco  Allergies  Meds         Reviewed and updated as needed this visit by Provider        Social History     Tobacco Use     Smoking status: Former Smoker     " Packs/day: 1.00     Years: 5.00     Pack years: 5.00     Types: Cigarettes     Last attempt to quit: 1965     Years since quittin.6     Smokeless tobacco: Never Used   Substance Use Topics     Alcohol use: Yes     Alcohol/week: 1.0 standard drinks     Types: 1 Standard drinks or equivalent per week     Comment: 1 per month       Alcohol Use 2020   Prescreen: >3 drinks/day or >7 drinks/week? No   Prescreen: >3 drinks/day or >7 drinks/week? -     L thigh pain lateral and anterior thigh.  Pain does not worsen with ambulation.  No back or groin pain.  This is been going on for over a month but has improved significantly in the last   Week or so.     Current providers sharing in care for this patient include:   Patient Care Team:  Peter Nicole MD as PCP - General (Internal Medicine)  Peter Nicole MD as Assigned PCP    The following health maintenance items are reviewed in Epic and correct as of today:  Health Maintenance   Topic Date Due     ZOSTER IMMUNIZATION (1 of 2) 1988     ASTHMA ACTION PLAN  2017     ADVANCE CARE PLANNING  06/10/2019     ASTHMA CONTROL TEST  12/10/2019     PHQ-2  2020     MEDICARE ANNUAL WELLNESS VISIT  06/10/2020     FALL RISK ASSESSMENT  06/10/2020     INFLUENZA VACCINE (1) 2020     DTAP/TDAP/TD IMMUNIZATION (3 - Td) 2026     PNEUMOCOCCAL IMMUNIZATION 65+ LOW/MEDIUM RISK  Completed     IPV IMMUNIZATION  Aged Out     MENINGITIS IMMUNIZATION  Aged Out     HEPATITIS B IMMUNIZATION  Aged Out     Lab work is in process  Labs reviewed in EPIC      Review of Systems   Constitutional: Negative for chills and fever.   HENT: Negative for congestion, ear pain, hearing loss and sore throat.    Eyes: Negative for pain and visual disturbance.   Respiratory: Negative for cough and shortness of breath.    Cardiovascular: Negative for chest pain, palpitations and peripheral edema.   Gastrointestinal: Negative for abdominal pain, constipation, diarrhea,  "heartburn, hematochezia and nausea.   Genitourinary: Positive for frequency and urgency. Negative for dysuria and genital sores.   Musculoskeletal: Positive for arthralgias. Negative for joint swelling and myalgias.   Skin: Negative for rash.   Neurological: Negative for dizziness, weakness, headaches and paresthesias.   Psychiatric/Behavioral: Negative for mood changes. The patient is not nervous/anxious.        OBJECTIVE:   /70 (Cuff Size: Adult Regular)   Pulse 62   Temp 98.2  F (36.8  C) (Oral)   Ht 1.778 m (5' 10\")   Wt 97 kg (213 lb 14.4 oz)   SpO2 97%   BMI 30.69 kg/m   Estimated body mass index is 30.69 kg/m  as calculated from the following:    Height as of this encounter: 1.778 m (5' 10\").    Weight as of this encounter: 97 kg (213 lb 14.4 oz).  Physical Exam  GENERAL: healthy, alert and no distress  EYES: Eyes grossly normal to inspection, PERRL and conjunctivae and sclerae normal  HENT: normal cephalic/atraumatic, nose and mouth without ulcers or lesions, oropharynx clear and oral mucous membranes moist  NECK: no adenopathy, no asymmetry, masses, or scars and thyroid normal to palpation  RESP: lungs clear to auscultation - no rales, rhonchi or wheezes  CV: regular rate and rhythm, normal S1 S2, no S3 or S4, no murmur, click or rub, no peripheral edema and peripheral pulses strong  ABDOMEN: soft, nontender, no hepatosplenomegaly, no masses and bowel sounds normal  MS: no gross musculoskeletal defects noted, no edema  SKIN: no suspicious lesions or rashes  NEURO: Normal strength and tone, mentation intact and speech normal  PSYCH: mentation appears normal, affect normal/bright  LYMPH: no cervical, supraclavicular, axillary, or inguinal adenopathy    X-ray: pending    ASSESSMENT / PLAN:   1. Medicare annual wellness visit, subsequent  Shingrix recommended.  Otherwise up-to-date on screening and preventative care    2. CARDIOVASCULAR SCREENING; LDL GOAL LESS THAN 160  - Lipid panel reflex to " "direct LDL Fasting  - Basic metabolic panel    3. Hip pain, left  Possibly back source or hip.  Exam revealed pretty limited range of motion of both hips though this was nontender.  - XR Pelvis and Hip Left 1 View; Future    4. Mild intermittent asthma without complication  Well-controlled.  No longer using a controller.        COUNSELING:  Reviewed preventive health counseling, as reflected in patient instructions    Estimated body mass index is 30.69 kg/m  as calculated from the following:    Height as of this encounter: 1.778 m (5' 10\").    Weight as of this encounter: 97 kg (213 lb 14.4 oz).         reports that he quit smoking about 55 years ago. His smoking use included cigarettes. He has a 5.00 pack-year smoking history. He has never used smokeless tobacco.      Appropriate preventive services were discussed with this patient, including applicable screening as appropriate for cardiovascular disease, diabetes, osteopenia/osteoporosis, and glaucoma.  As appropriate for age/gender, discussed screening for colorectal cancer, prostate cancer, breast cancer, and cervical cancer. Checklist reviewing preventive services available has been given to the patient.    Reviewed patients plan of care and provided an AVS. The Basic Care Plan (routine screening as documented in Health Maintenance) for Ranjan meets the Care Plan requirement. This Care Plan has been established and reviewed with the Patient.    Counseling Resources:  ATP IV Guidelines  Pooled Cohorts Equation Calculator  Breast Cancer Risk Calculator  FRAX Risk Assessment  ICSI Preventive Guidelines  Dietary Guidelines for Americans, 2010  USDA's MyPlate  ASA Prophylaxis  Lung CA Screening    Peter Nicole MD  [facility]    Identified Health Risks:  "

## 2020-08-24 NOTE — PATIENT INSTRUCTIONS
Preventive Health Recommendations:     See your health care provider every year to    Review health changes.     Discuss preventive care.      Review your medicines if your doctor has prescribed any.      Talk with your health care provider about whether you should have a test to screen for prostate cancer (PSA).    Every 3 years, have a diabetes test (fasting glucose). If you are at risk for diabetes, you should have this test more often.    Every 5 years, have a cholesterol test. Have this test more often if you are at risk for high cholesterol or heart disease.     Every 10 years, have a colonoscopy. Or, have a yearly FIT test (stool test). These exams will check for colon cancer.    Talk to with your health care provider about screening for Abdominal Aortic Aneurysm if you have a family history of AAA or have a history of smoking.    Shots:     Get a flu shot each year.     Get a tetanus shot every 10 years.     Talk to your doctor about your pneumonia vaccines. There are now two you should receive - Pneumovax (PPSV 23) and Prevnar (PCV 13).     Talk to your pharmacist about a shingles vaccine.     Talk to your doctor about the hepatitis B vaccine.  Nutrition:     Eat at least 5 servings of fruits and vegetables each day.     Eat whole-grain bread, whole-wheat pasta and brown rice instead of white grains and rice.     Get adequate Calcium and Vitamin D.   Lifestyle    Exercise for at least 150 minutes a week (30 minutes a day, 5 days a week). This will help you control your weight and prevent disease.     Limit alcohol to one drink per day.     No smoking.     Wear sunscreen to prevent skin cancer.    See your dentist every six months for an exam and cleaning.    See your eye doctor every 1 to 2 years to screen for conditions such as glaucoma, macular degeneration, cataracts, etc.    Personalized Prevention Plan  You are due for the preventive services outlined below.  Your care team is available to assist you  in scheduling these services.  If you have already completed any of these items, please share that information with your care team to update in your medical record.  Health Maintenance Due   Topic Date Due     Zoster (Shingles) Vaccine (1 of 2) 03/23/1988     Asthma Action Plan - yearly  03/17/2017     Discuss Advance Care Planning  06/10/2019     Asthma Control Test  12/10/2019     PHQ-2  01/01/2020     Annual Wellness Visit  06/10/2020     FALL RISK ASSESSMENT  06/10/2020

## 2020-08-25 ASSESSMENT — ASTHMA QUESTIONNAIRES: ACT_TOTALSCORE: 24

## 2020-09-03 ENCOUNTER — TELEPHONE (OUTPATIENT)
Dept: INTERNAL MEDICINE | Facility: CLINIC | Age: 80
End: 2020-09-03

## 2020-09-03 NOTE — TELEPHONE ENCOUNTER
Reason for Call:  Request for results:    Name of test or procedure: lab & xray results    Date of test of procedure: 08.24.20    Location of the test or procedure: [facility]    OK to leave the result message on voice mail or with a family member? YES    Phone number Patient can be reached at:  Cell number on file:    Telephone Information:   Mobile [phone]       Additional comments: the patient wants to know his results asap re lab / xray results, he has been waiting    Call taken on 9/3/2020 at 9:38 AM by Kylee Azul

## 2020-09-03 NOTE — TELEPHONE ENCOUNTER
Lab letter was sent 09/02/2020, pt informed letter sent and also given verbal normal results    Rita Dougherty CMA

## 2020-10-09 ENCOUNTER — VIRTUAL VISIT (OUTPATIENT)
Dept: SLEEP MEDICINE | Facility: CLINIC | Age: 80
End: 2020-10-09
Payer: MEDICARE

## 2020-10-09 VITALS — WEIGHT: 212 LBS | HEIGHT: 69 IN | BODY MASS INDEX: 31.4 KG/M2

## 2020-10-09 DIAGNOSIS — G47.33 OSA (OBSTRUCTIVE SLEEP APNEA): Primary | ICD-10-CM

## 2020-10-09 PROCEDURE — 99443 PR PHYSICIAN TELEPHONE EVALUATION 21-30 MIN: CPT | Performed by: PHYSICIAN ASSISTANT

## 2020-10-09 ASSESSMENT — MIFFLIN-ST. JEOR: SCORE: 1644.07

## 2020-10-09 NOTE — PATIENT INSTRUCTIONS
Delia with the humidity settings and consider a nasal mask instead of nasal pillows to see if the runny nose improves. May consider a nasal spray such as saline gel or fluticasone to see if that helps the rhinitis as well. May also consider diphenhydramine as that worked in the past.

## 2020-10-09 NOTE — PROGRESS NOTES
"Ranjan Pena is a 80 year old male who is being evaluated via a billable telephone visit.      The patient has been notified of following:     \"This telephone visit will be conducted via a call between you and your physician/provider. We have found that certain health care needs can be provided without the need for a physical exam.  This service lets us provide the care you need with a short phone conversation.  If a prescription is necessary we can send it directly to your pharmacy.  If lab work is needed we can place an order for that and you can then stop by our lab to have the test done at a later time.    Telephone visits are billed at different rates depending on your insurance coverage. During this emergency period, for some insurers they may be billed the same as an in-person visit.  Please reach out to your insurance provider with any questions.    If during the course of the call the physician/provider feels a telephone visit is not appropriate, you will not be charged for this service.\"    Patient has given verbal consent for Telephone visit?  Yes    What phone number would you like to be contacted at? [phone]    How would you like to obtain your AVS? Mail a copy          CPAP Follow-Up Visit:    Date on this visit: 10/9/2020    Ranjan Pena has a follow-up of his CPAP use for treatment of moderate DEB and hypoxemia. He was initially seen at the [facility] for trouble keeping his CPAP mask on through the night for 18 years. His medical history is significant for DEB (initially diagnosed in 2006), asthma, glaucoma and nephrolithiasis.      His sleep study here showed:  AHI was 29.8/hr, with desaturations down to 74%. He spent 17.2 minutes below 90% SpO2 and 9.9 minutes below 89%. RDI 29.8/hr.  REM RDI 39.4/hr.  Supine RDI 59/hr.  His lateral RDI was 12/hr. Periodic Limb Movement Index 24/hour, <1/hr were associated with arousals.    He feels things are going well. He uses a " Dianna. He changes the filters monthly. His main concern is that he gets a runny nose in the morning for about an hour. That has been going on for quite a while. He used to take Wall-dryl before he had CPAP and that used to help. He states he takes tamsulosin very infrequently.    PAP machine: RespirNotizza. Pressure settings: 7-10 cm    The compliance data shows that the patient used the CPAP on 85% of nights for >4 hours.  The 90th% pressure is 9.4 cm.  The average time in large leak is 2% of the night.  The average nightly usage is 7 hrs.  The average AHI is 3.6/hr.       Interface:  Mask: nasal pillows mask  Chin strap: No  Leak: No  Using Humidifier: Yes, uses most of the water.   Condensation in hose or mask: No     Difficulties with therapy:    [-] Snoring with CPAP: His wife may have mentioned he was snoring when they were last together, about 4 months ago.  [-] Difficulty tolerating the pressure:  [-] Epistaxis/dry nose:   [-] Nasal congestion:  [+] Dry mouth: once in a while, not very often  [-] Mouth breathing:   [-] Pain/skin breakdown:      Improvements noted with CPAP: He thinks he feels better. He has been on it a long time, so forgets what it was like before CPAP.  [+] waking up more refreshed  [+] sleeping better with less arousals  [+] nocturia improved   [+] improved energy level during the day    Weight change since sleep study: no significant weight change in the last 1-2 years.    Bedtime 10:30 PM. Wake time: 6:30 AM. He wakes in the night about once and usually gets back to sleep fairly quickly. He infrequently naps.     Past medical/surgical history, family history, social history, medications and allergies were reviewed.      Problem List:  Patient Active Problem List    Diagnosis Date Noted     DEB (obstructive sleep apnea)      Priority: Medium     Split Night:  Sleep Study 6/9/2016 - (212.0 lbs) AHI 29.8, RDI 29.8, Supine AHI 59.0, REM AHI 47.1, Low O2% 73.7%, Time Spent ?88% 9.9, Time  Spent ?89% 17.2, CPAP was titrated to a pressure of 11.       Advance Care Planning 12/20/2011     Priority: Medium     Advance Care Planning: Receipt of ACP document:  Received: Health Care Directive which was witnessed or notarized on 6-10-14.  Document previously scanned on 6-11-14.  Validation form completed and sent to be scanned.  Code Status reflects choices in most recent ACP document.  Confirmed/documented designated decision maker(s). See permanent comments section of demographics in clinical tab. View document(s) and details by clicking on code status. Added by Marilyn Downs RN, System ACP Coordinator [employer] on 7/2/2014.  Advance Care Planning: Initial facilitation introduction:   Ranjan Pena presented for initial session regarding ACP at the clinic. He was accompanied by self. Honoring Carine information provided and resources reviewed. He currently wishes to complete an ACP document.  He currently has the following questions or concerns about Advance Care Planning: none.  Confirmed/documented designated decision maker(s). See permanent comments section of demographics in clinical tab. Orders entered for code status to reflect current choices.Added by Nancy Fischer on 6/10/2014  Patient states has Advance Directive and will bring in a copy to clinic. 12/20/2011 Brionna Martinez           Mild intermittent asthma without complication      Priority: Medium     CARDIOVASCULAR SCREENING; LDL GOAL LESS THAN 160 10/31/2010     Priority: Medium        Impression/Plan:    (G47.33) DEB (obstructive sleep apnea)  (primary encounter diagnosis)  Comment: Ranjan seems to be doing well with CPAP. His main concern is that he gets a runny nose in the morning. That was an issue before CPAP as well, diphenhydramine helped. His wife commented that he snored with CPAP several months ago. He has not been observed lately.   Plan: Comprehensive DME        Continue auto CPAP 7-10 cm. [place] with the humidity  settings and consider a nasal mask instead of nasal pillows to see if the runny nose improves. May consider a nasal spray such as saline gel or fluticasone to see if that helps the rhinitis as well. May also consider diphenhydramine as that worked in the past. He was encouraged to let me know if he is still snoring with CPAP next time he is observed. He may have a little mouth leak. It does not seem to be causing much dry mouth or reducingefficacy of the CPAP, so I will not pursue correcting that.     He will follow up with me in about 1 year(s).     23 minutes (8:30 AM-8:53 AM) spent with patient, all of which were spent face-to-face counseling, consulting, coordinating plan of care.      Bennett Goltz, PA-C    CC: No ref. provider found

## 2020-12-29 ENCOUNTER — TELEPHONE (OUTPATIENT)
Dept: INTERNAL MEDICINE | Facility: CLINIC | Age: 80
End: 2020-12-29

## 2020-12-29 NOTE — TELEPHONE ENCOUNTER
.Reason for Call:   pneumonia vaccine    Detailed comments: Patient is wondering if he had a pneumonia injection ?    Phone Number Patient can be reached at: Cell number on file:    Telephone Information:   Mobile [phone]       Best Time: anytime    Can we leave a detailed message on this number? YES    Call taken on 12/29/2020 at 9:11 AM by Laisha Chino

## 2021-06-30 ENCOUNTER — TELEPHONE (OUTPATIENT)
Dept: SLEEP MEDICINE | Facility: CLINIC | Age: 81
End: 2021-06-30

## 2021-06-30 NOTE — TELEPHONE ENCOUNTER
Reason for call:  Other   Patient called regarding (reason for call): call back  Additional comments: Patient contacted CPAP  and was directed to contact Dr./clinic to advise if he should discontinue CPAP use. Please call back    Phone number to reach patient:  Home number on file [phone] (home)    Best Time:  any    Can we leave a detailed message on this number?  YES    Travel screening: Not Applicable

## 2021-07-15 ENCOUNTER — OFFICE VISIT (OUTPATIENT)
Dept: INTERNAL MEDICINE | Facility: CLINIC | Age: 81
End: 2021-07-15
Payer: MEDICARE

## 2021-07-15 ENCOUNTER — VIRTUAL VISIT (OUTPATIENT)
Dept: SLEEP MEDICINE | Facility: CLINIC | Age: 81
End: 2021-07-15
Payer: MEDICARE

## 2021-07-15 VITALS
WEIGHT: 209.1 LBS | DIASTOLIC BLOOD PRESSURE: 68 MMHG | HEIGHT: 69 IN | TEMPERATURE: 97 F | BODY MASS INDEX: 30.97 KG/M2 | SYSTOLIC BLOOD PRESSURE: 128 MMHG | OXYGEN SATURATION: 95 % | HEART RATE: 60 BPM

## 2021-07-15 VITALS — BODY MASS INDEX: 30.96 KG/M2 | HEIGHT: 69 IN | WEIGHT: 209 LBS

## 2021-07-15 DIAGNOSIS — Z53.9 ERRONEOUS ENCOUNTER--DISREGARD: Primary | ICD-10-CM

## 2021-07-15 DIAGNOSIS — G47.33 OSA (OBSTRUCTIVE SLEEP APNEA): Primary | ICD-10-CM

## 2021-07-15 PROCEDURE — 99442 PR PHYSICIAN TELEPHONE EVALUATION 11-20 MIN: CPT | Mod: 95 | Performed by: PHYSICIAN ASSISTANT

## 2021-07-15 RX ORDER — NAPROXEN 500 MG/1
TABLET ORAL
COMMUNITY
End: 2022-03-22

## 2021-07-15 ASSESSMENT — ENCOUNTER SYMPTOMS
PARESTHESIAS: 0
FREQUENCY: 0
DIARRHEA: 0
HEARTBURN: 0
HEMATOCHEZIA: 0
FEVER: 0
ARTHRALGIAS: 1
NAUSEA: 0
WEAKNESS: 0
HEMATURIA: 0
ABDOMINAL PAIN: 0
EYE PAIN: 0
SORE THROAT: 0
MYALGIAS: 0
DYSURIA: 0
NERVOUS/ANXIOUS: 0
SHORTNESS OF BREATH: 0
HEADACHES: 0
DIZZINESS: 0
PALPITATIONS: 0
COUGH: 0
JOINT SWELLING: 0
CONSTIPATION: 0
CHILLS: 0

## 2021-07-15 ASSESSMENT — MIFFLIN-ST. JEOR
SCORE: 1625.91
SCORE: 1625.46

## 2021-07-15 ASSESSMENT — ASTHMA QUESTIONNAIRES
QUESTION_4 LAST FOUR WEEKS HOW OFTEN HAVE YOU USED YOUR RESCUE INHALER OR NEBULIZER MEDICATION (SUCH AS ALBUTEROL): NOT AT ALL
QUESTION_1 LAST FOUR WEEKS HOW MUCH OF THE TIME DID YOUR ASTHMA KEEP YOU FROM GETTING AS MUCH DONE AT WORK, SCHOOL OR AT HOME: NONE OF THE TIME
QUESTION_3 LAST FOUR WEEKS HOW OFTEN DID YOUR ASTHMA SYMPTOMS (WHEEZING, COUGHING, SHORTNESS OF BREATH, CHEST TIGHTNESS OR PAIN) WAKE YOU UP AT NIGHT OR EARLIER THAN USUAL IN THE MORNING: NOT AT ALL
QUESTION_5 LAST FOUR WEEKS HOW WOULD YOU RATE YOUR ASTHMA CONTROL: WELL CONTROLLED
ACT_TOTALSCORE: 24
QUESTION_2 LAST FOUR WEEKS HOW OFTEN HAVE YOU HAD SHORTNESS OF BREATH: NOT AT ALL

## 2021-07-15 ASSESSMENT — ACTIVITIES OF DAILY LIVING (ADL): CURRENT_FUNCTION: NO ASSISTANCE NEEDED

## 2021-07-15 NOTE — PROGRESS NOTES
"SUBJECTIVE:   Ranjan Pena is a 81 year old male who presents for Preventive Visit.      Patient has been advised of split billing requirements and indicates understanding: Yes   Are you in the first 12 months of your Medicare coverage?  No    Healthy Habits:     In general, how would you rate your overall health?  Good    Frequency of exercise:  2-3 days/week    Duration of exercise:  Less than 15 minutes    Do you usually eat at least 4 servings of fruit and vegetables a day, include whole grains    & fiber and avoid regularly eating high fat or \"junk\" foods?  Yes    Taking medications regularly:  Yes    Medication side effects:  None    Ability to successfully perform activities of daily living:  No assistance needed    Home Safety:  No safety concerns identified    Hearing Impairment:  No hearing concerns    In the past 6 months, have you been bothered by leaking of urine?  No    In general, how would you rate your overall mental or emotional health?  Good      PHQ-2 Total Score: 0    Additional concerns today:  Yes    Do you feel safe in your environment? YES    Have you ever done Advance Care Planning? (For example, a Health Directive, POLST, or a discussion with a medical provider or your loved ones about your wishes): Yes, advance care planning is on file.       Fall risk  Fallen 2 or more times in the past year?: No  Any fall with injury in the past year?: No    Cognitive Screening   1) Repeat 3 items (Leader, Season, Table)    2) Clock draw: NORMAL  3) 3 item recall: Recalls 1 object   Results: NORMAL clock, 1-2 items recalled: COGNITIVE IMPAIRMENT LESS LIKELY    Mini-CogTM Copyright RE Muñoz. Licensed by the author for use in [facility]; reprinted with permission (napoleon@.[email or web address]). All rights reserved.      Do you have sleep apnea, excessive snoring or daytime drowsiness?: yes    Reviewed and updated as needed this visit by clinical staff  Tobacco  Allergies  Meds   Med Hx  Surg Hx  Fam " Hx  Soc Hx        Reviewed and updated as needed this visit by Provider                Social History     Tobacco Use     Smoking status: Former Smoker     Packs/day: 1.00     Years: 5.00     Pack years: 5.00     Types: Cigarettes     Quit date: 1965     Years since quittin.5     Smokeless tobacco: Never Used   Substance Use Topics     Alcohol use: Yes     Alcohol/week: 1.0 standard drinks     Types: 1 Standard drinks or equivalent per week     Comment: 1 per month         Alcohol Use 7/15/2021   Prescreen: >3 drinks/day or >7 drinks/week? No   Prescreen: >3 drinks/day or >7 drinks/week? -   No flowsheet data found.        {additional problems to add (Optional):301280}    Current providers sharing in care for this patient include:   Patient Care Team:  Peter Nicole MD as PCP - General (Internal Medicine)  Peter Nicole MD as Assigned PCP  Goltz, Bennett Ezra, PA-C as Assigned Neuroscience Provider    The following health maintenance items are reviewed in Epic and correct as of today:  Health Maintenance Due   Topic Date Due     ANNUAL REVIEW OF HM ORDERS  Never done     COVID-19 Vaccine (1) Never done     ZOSTER IMMUNIZATION (1 of 2) Never done     ASTHMA ACTION PLAN  2017     ASTHMA CONTROL TEST  2021     {Chronicprobdata (optional):737145}  {Decision Support (Optional):297912}    {Mammo DS 75+ :088116}  Pertinent mammograms are reviewed under the imaging tab.    Review of Systems   Constitutional: Negative for chills and fever.   HENT: Negative for congestion, ear pain, hearing loss and sore throat.    Eyes: Positive for visual disturbance. Negative for pain.   Respiratory: Negative for cough and shortness of breath.    Cardiovascular: Negative for chest pain, palpitations and peripheral edema.   Gastrointestinal: Negative for abdominal pain, constipation, diarrhea, heartburn, hematochezia and nausea.   Genitourinary: Negative for discharge, dysuria, frequency, genital sores,  "hematuria, impotence and urgency.   Musculoskeletal: Positive for arthralgias. Negative for joint swelling and myalgias.   Skin: Negative for rash.   Neurological: Negative for dizziness, weakness, headaches and paresthesias.   Psychiatric/Behavioral: Negative for mood changes. The patient is not nervous/anxious.      {ROS COMP (Optional):809395}    OBJECTIVE:   There were no vitals taken for this visit. Estimated body mass index is 31.77 kg/m  as calculated from the following:    Height as of 10/9/20: 1.74 m (5' 8.5\").    Weight as of 10/9/20: 96.2 kg (212 lb).  Physical Exam  {Exam (Optional) :894471}    {Diagnostic Test Results (Optional):612768::\"Diagnostic Test Results:\",\"Labs reviewed in Epic\"}    ASSESSMENT / PLAN:   {Diag Picklist:278123}    Patient has been advised of split billing requirements and indicates understanding: {YES / NO:804853::\"Yes\"}  COUNSELING:  {Medicare Counselin}    Estimated body mass index is 31.77 kg/m  as calculated from the following:    Height as of 10/9/20: 1.74 m (5' 8.5\").    Weight as of 10/9/20: 96.2 kg (212 lb).    {Weight Management Plan (ACO) Complete if BMI is abnormal-  Ages 18-64  BMI >24.9.  Age 65+ with BMI <23 or >30 (Optional):420239}    He reports that he quit smoking about 56 years ago. His smoking use included cigarettes. He has a 5.00 pack-year smoking history. He has never used smokeless tobacco.      Appropriate preventive services were discussed with this patient, including applicable screening as appropriate for cardiovascular disease, diabetes, osteopenia/osteoporosis, and glaucoma.  As appropriate for age/gender, discussed screening for colorectal cancer, prostate cancer, breast cancer, and cervical cancer. Checklist reviewing preventive services available has been given to the patient.    Reviewed patients plan of care and provided an AVS. The {CarePlan:112963} for Ranjan meets the Care Plan requirement. This Care Plan has been established and " reviewed with the {PATIENT, FAMILY MEMBER, CAREGIVER:407952}.    Counseling Resources:  ATP IV Guidelines  Pooled Cohorts Equation Calculator  Breast Cancer Risk Calculator  Breast Cancer: Medication to Reduce Risk  FRAX Risk Assessment  ICSI Preventive Guidelines  Dietary Guidelines for Americans, 2010  USDA's MyPlate  ASA Prophylaxis  Lung CA Screening    Peter Nicole MD  [facility]    Identified Health Risks:

## 2021-07-15 NOTE — NURSING NOTE
"Chief Complaint   Patient presents with     Medicare Visit     /68   Pulse 60   Temp 97  F (36.1  C) (Temporal)   Ht 1.74 m (5' 8.5\")   Wt 94.8 kg (209 lb 1.6 oz)   SpO2 95%   BMI 31.33 kg/m   Estimated body mass index is 31.33 kg/m  as calculated from the following:    Height as of this encounter: 1.74 m (5' 8.5\").    Weight as of this encounter: 94.8 kg (209 lb 1.6 oz).        Health Maintenance due pending provider review:  ACT    completed    Rita Dougherty CMA  "

## 2021-07-15 NOTE — PATIENT INSTRUCTIONS
Your sleep apnea treatment may be affected by device recall    Our records show that you may have a Sheldon Respironics CPAP for the treatment of sleep apnea. Many of these devices have been recalled* by the  for replacement. [facility] recommends:     1) If you are using a Resmed device, continue using the device.  2) If you have a Sheldon Respironics device, register your device for confirmation of type of device and repair of the device at https://[email or web address]/healthcare/e/sleep/communications/src-update -if you cannot use link, call [phone].  The website will assist you in obtaining the serial number for registration.   3) If you are using a Sheldon Respironics CPAP or Bilevel PAP device and you do not have immediate breathing, driving or cardiovascular risks without the device, consider stopping use of the device after verification that is has been recalled. Discuss this decision with your medical provider if you are uncertain about your medical risks.  4) If you are not using Respironics CPAP but are using a Respironics advanced device for breathing support (AVAPS, ASV, Bilevel PAP), continue using the device and review 5 and 6 below).     5) If you continue the device, do not include ozone generating  connected to PAP devices.  6) f you continue the device, you may choose to use a bacterial filter to reduce particulates from the device although on CPAP devices, pressures may need to be increased with the filter in place. These filters are not as effective as repair of the device.     ?       You may also choose discuss with your provider alternative approaches to treatment.      *[employer] is voluntarily recalling the below devices due to two (2) issues related to the polyester-based polyurethane (PE-PUR) sound abatement foam used in Sheldon Continuous and Non-Continuous Ventilators: 1) PE-PUR foam may degrade into particles which may enter the  device's the air pathway and be ingested or inhaled by the user, and 2) the PE-PUR foam may off-gas certain chemicals. The foam degradation may be exacerbated by use of unapproved cleaning methods, such as ozone (see FDA safety communication on use of Ozone ), and off-gassing may occur during initial operation and may possibly continue throughout the device's useful life.   These issues can result in serious injury which can be life-threatening, cause permanent impairment, and/or require medical intervention to preclude permanent impairment. To date, [facility] has received several complaints regarding the presence of black debris/particles within the airpath circuit (extending from the device outlet, humidifier, tubing, and mask). [facility] also has received reports of headache, upper airway irritation, cough, chest pressure and sinus infection. The potential risks of particulate exposure include: Irritation (skin, eye, and respiratory tract), inflammatory response, headache, asthma, adverse effects to other organs (e.g. kidneys and liver) and toxic carcinogenic affects. The potential risks of chemical exposure due to off-gassing include: headache/dizziness, irritation (eyes, nose, respiratory tract, skin), hypersensitivity, nausea/vomiting, toxic and carcinogenic effects. There have been no reports of death as a result of these issues.    Actions to be taken:  Discontinue the use of your device.  Do not continue to use ozone  with the device.     [place] affected devices on the recall website, www.[employer].Youca.st/SRC-update    i. The website provides current information on the status of the recall and how  to receive permanent corrective action to address the two issues.    ii. The website also provides instructions on how to locate an affected device  Serial Number and will guide users through the registration process.    iii. In the [place], call [phone] Service Hotline if you cannot visit the  website             Your BMI is Body mass index is 31.32 kg/m .  Weight management is a personal decision.  If you are interested in exploring weight loss strategies, the following discussion covers the approaches that may be successful. Body mass index (BMI) is one way to tell whether you are at a healthy weight, overweight, or obese. It measures your weight in relation to your height.  A BMI of 18.5 to 24.9 is in the healthy range. A person with a BMI of 25 to 29.9 is considered overweight, and someone with a BMI of 30 or greater is considered obese. More than two-thirds of American adults are considered overweight or obese.  Being overweight or obese increases the risk for further weight gain. Excess weight may lead to heart disease and diabetes.  Creating and following plans for healthy eating and physical activity may help you improve your health.  Weight control is part of healthy lifestyle and includes exercise, emotional health, and healthy eating habits. Careful eating habits lifelong are the mainstay of weight control. Though there are significant health benefits from weight loss, long-term weight loss with diet alone may be very difficult to achieve- studies show long-term success with dietary management in less than 10% of people. Attaining a healthy weight may be especially difficult to achieve in those with severe obesity. In some cases, medications, devices and surgical management might be considered.  What can you do?  If you are overweight or obese and are interested in methods for weight loss, you should discuss this with your provider.     Consider reducing daily calorie intake by 500 calories.     Keep a food journal.     Avoiding skipping meals, consider cutting portions instead.    Diet combined with exercise helps maintain muscle while optimizing fat loss. Strength training is particularly important for building and maintaining muscle mass. Exercise helps reduce stress, increase energy, and  improves fitness. Increasing exercise without diet control, however, may not burn enough calories to loose weight.       Start walking three days a week 10-20 minutes at a time    Work towards walking thirty minutes five days a week     Eventually, increase the speed of your walking for 1-2 minutes at time    In addition, we recommend that you review healthy lifestyles and methods for weight loss available through the National Institutes of Health patient information sites:  http://win.niddk.nih.gov/publications/index.htm    And look into health and wellness programs that may be available through your health insurance provider, employer, local community center, or neeraj club.

## 2021-07-15 NOTE — PROGRESS NOTES
"Ranjan Pena is a 81 year old male being evaluated via a billable telephone visit.     \"This telephone visit will be conducted via a call between you and your physician/provider. We have found that certain health care needs can be provided without the need for an in-person visit or physical exam.  This service lets us provide the care you need with a telephone conversation.  If a prescription is necessary we can send it directly to your pharmacy.  If lab work is needed we can place an order for that and you can then stop by our lab to have the test done at a later time.\"    Telephone visits are billed at different rates depending on your insurance coverage.  Please reach out to your insurance provider with any questions.    Patient has given verbal consent for  a Telephone visit? Yes    What telephone number would you like your provider to contact at at:  [phone]    How would you like to obtain your AVS? Mail a copy    Ludmila Bledsoe CMA    Telephone Visit Details:     Telephone Visit Start Time: 1:30 PM    Telephone Visit End Time:1:47 PM      CPAP Follow-Up Visit:    Date on this visit: 7/15/2021    Ranjan Pena has a follow-up of his CPAP use for treatment of moderate DEB and hypoxemia. He was initially seen at the [facility] for trouble keeping his CPAP mask on through the night for 18 years. His medical history is significant for DEB (initially diagnosed in 2006), asthma, glaucoma and nephrolithiasis.      His sleep study here showed:  AHI was 29.8/hr, with desaturations down to 74%. He spent 17.2 minutes below 90% SpO2 and 9.9 minutes below 89%. RDI 29.8/hr.  REM RDI 39.4/hr.  Supine RDI 59/hr.  His lateral RDI was 12/hr. Periodic Limb Movement Index 24/hour, <1/hr were associated with arousals.    He primarily has questions about the machine recall. He has not seen any evidence of particles in the system. He had been using SoClean nightly. He quit using CPAP around the beginning " of the month. He has noticed any symptoms from the machine.  He has not noticed a lot of difference being off of CPAP. He may get a little less sleep.    PAP machine: RespirFreeMonee. Pressure settings: 7-10 cm    The compliance data shows that from 6/1/21-6/30/21, the patient used the CPAP for 30/30 nights, 100% of nights for >4 hours.  The 90th% pressure is 9.5 cm.  The average time in large leak is 2 min.  The average nightly usage is 6:58.  The average AHI is 3.3/hr.      Interface:  Mask: nasal pillows mask  Chin strap: No  Leak: No  Using Humidifier: Yes  Condensation in hose or mask: No     Difficulties with therapy:    [-] Snoring with CPAP:  [-] Difficulty tolerating the pressure:  [-] Epistaxis/dry nose:   [-] Nasal congestion:  [+] Dry mouth: once in a while in the morning, not a big problem  [-] Mouth breathing:   [-] Pain/skin breakdown:      Improvements noted with CPAP:   [+] waking up more refreshed  [+] sleeping better with less arousals  [+] improved energy level during the day    Weight change since sleep study: 209 lbs      Past medical/surgical history, family history, social history, medications and allergies were reviewed.      Problem List:  Patient Active Problem List    Diagnosis Date Noted     DEB (obstructive sleep apnea)      Priority: Medium     Split Night:  Sleep Study 6/9/2016 - (212.0 lbs) AHI 29.8, RDI 29.8, Supine AHI 59.0, REM AHI 47.1, Low O2% 73.7%, Time Spent ?88% 9.9, Time Spent ?89% 17.2, CPAP was titrated to a pressure of 11.       Advance Care Planning 12/20/2011     Priority: Medium     Advance Care Planning: Receipt of ACP document:  Received: Health Care Directive which was witnessed or notarized on 6-10-14.  Document previously scanned on 6-11-14.  Validation form completed and sent to be scanned.  Code Status reflects choices in most recent ACP document.  Confirmed/documented designated decision maker(s). See permanent comments section of demographics in clinical tab.  View document(s) and details by clicking on code status. Added by Marilyn Downs RN, System ACP Coordinator [place] on 7/2/2014.  Advance Care Planning: Initial facilitation introduction:   Ranjan Pena presented for initial session regarding ACP at the clinic. He was accompanied by self. [facility] [place] information provided and resources reviewed. He currently wishes to complete an ACP document.  He currently has the following questions or concerns about Advance Care Planning: none.  Confirmed/documented designated decision maker(s). See permanent comments section of demographics in clinical tab. Orders entered for code status to reflect current choices.Added by Nancy Fischer on 6/10/2014  Patient states has Advance Directive and will bring in a copy to clinic. 12/20/2011 Brionna Juan           Mild intermittent asthma without complication      Priority: Medium     CARDIOVASCULAR SCREENING; LDL GOAL LESS THAN 160 10/31/2010     Priority: Medium        Impression/Plan:    (G47.33) DEB (obstructive sleep apnea)  (primary encounter diagnosis)  Comment: Ranjan scheduled this appointment primarily to discuss the recall of Respironics machines. He has not noted any symptoms that would be attributable to exposure to the foam in the machine even though he had been using the SoClean. He stopped using the CPAP about 2 weeks ago when he heard about the recall.  Plan: Comprehensive DME        Given the severity of his apnea and hypoxemia, I recommended he continue auto CPAP 7-10 cm. A prescription was written for new supplies. Obtain an in-line bacterial filter for use until Respironics replaces the machine. He was given the phone number to register his machine with Respironics.         He will follow up with me in about 1 year(s).     Phone visit duration: 17 min     Bennett Goltz, PA-C    CC: No ref. provider found

## 2021-07-16 ASSESSMENT — ASTHMA QUESTIONNAIRES: ACT_TOTALSCORE: 24

## 2021-07-16 NOTE — PROGRESS NOTES
AVS has been mailed to patients home address July 16, 2021    1 YEAR RECALL CREATED     Alison LOMAX CMA Sleep Medicine

## 2022-03-22 ENCOUNTER — VIRTUAL VISIT (OUTPATIENT)
Dept: INTERNAL MEDICINE | Facility: CLINIC | Age: 82
End: 2022-03-22
Payer: MEDICARE

## 2022-03-22 DIAGNOSIS — M54.50 ACUTE RIGHT-SIDED LOW BACK PAIN WITHOUT SCIATICA: Primary | ICD-10-CM

## 2022-03-22 PROCEDURE — 99442 PR PHYSICIAN TELEPHONE EVALUATION 11-20 MIN: CPT | Mod: 95 | Performed by: INTERNAL MEDICINE

## 2022-03-22 RX ORDER — IBUPROFEN 200 MG
400 TABLET ORAL 3 TIMES DAILY
Qty: 42 TABLET | Refills: 0 | COMMUNITY
Start: 2022-03-22 | End: 2022-03-29

## 2022-03-22 NOTE — PROGRESS NOTES
"Ranjan is a 81 year old who is being evaluated via a billable telephone visit.      What phone number would you like to be contacted at? [phone]  How would you like to obtain your AVS? MyChart    Assessment & Plan     Acute right-sided low back pain without sciatica  Try raising lower legs/bending knees while in bed  Ok for limited nsaids- on ppi- also no CKD.   If no improvement see me   - ibuprofen (ADVIL/MOTRIN) 200 MG tablet; Take 2 tablets (400 mg) by mouth 3 times daily for 7 days             BMI:   Estimated body mass index is 31.32 kg/m  as calculated from the following:    Height as of 7/15/21: 1.74 m (5' 8.5\").    Weight as of 7/15/21: 94.8 kg (209 lb).           No follow-ups on file.    Peter Nicole MD  [facility]    Subjective   Ranjan is a 81 year old who presents for the following health issues     HPI     Concern - R hip pain,   Onset: 5+ days  Description: pain on R side of hip, buttocks area and upper thigh   Intensity: moderate  Progression of Symptoms:  same  Accompanying Signs & Symptoms: worse when laying flat, better when on side, or walking sometimes gets sharp shooting pain  Previous history of similar problem: none  Precipitating factors:        Worsened by: pressure, laying on side   Alleviating factors:        Improved by: n/a  Therapies tried and outcome:  none     Had the patient lie in his back.  He reports some pain on the right side of his buttock.  He flexed his knees keeping his feet on the floor.  Notes this was beneficial with less pain.  Sitting up from the lying position seem to exacerbate the discomfort.  No radicular symptoms during these movements      Review of Systems         Objective           Vitals:  No vitals were obtained today due to virtual visit.    Physical Exam   alert and no distress                Phone call duration: 12 minutes  "

## 2022-03-22 NOTE — PATIENT INSTRUCTIONS
When You Have Low Back Pain    Caring for Your Back  You are not alone.    Low back pain is very common. Nearly half of all adults have low back pain in any given year. The good news is that back pain is rarely a danger to your health. Most people can manage their back pain on their own and about half of them start feeling better within 2 weeks. In 9 out of 10 cases, low back pain goes away or no longer limits daily activity within 6 weeks.     Your outlook is good!    Your symptoms tell us that your low back pain is most likely not a danger to you. Most of the time we do not know the exact cause of low back pain, even if you see a doctor or have an MRI. However, treatment can still work without knowing the cause of the pain. Less than 1 in 100 people need surgery for their back pain.     What can I do about my low back pain?     There are three things you can do to ease low back pain and help it go away.    Use heat or cold packs.    Take medicine as directed.    Use positions, movements and exercises.     Using heat or cold packs    Try cold packs or gentle heat to ease your pain. Use whichever gives you the most relief. Apply the cold pack or heat for 15 minutes at a time, as often as needed.    Taking medicine      If your doctor has prescribed medicine, be sure to follow the directions.    If you take over-the-counter medicine, read and follow the directions.    Talk to your doctor if you have any questions.     Using positions, movements and exercises    Research tells us that moving your joints and muscles can help you recover from back pain. Such activity should be simple and gentle. Use the positions in the photos as well as walking to help relieve your pain. Try taking a short walk every 3 to 4 hours during the day. Walk for a few minutes inside your home or take longer walks outside, on a treadmill or at a mall. Slowly increase the amount of time you walk. Expect discomfort when you begin, but it should  lessen as your back starts to heal. When your back feels better, walk daily to keep your back and body healthy.    Finding a comfortable position    When your back pain is new, certain positions will ease your pain. Gently try each of the positions below until you find one that is helpful. Once you find a position of comfort, use it as often as you like when you are resting. You will recover faster if you combine rest with activity.         Lie on your back with your legs bent. You can do this by placing a pillow under your knees. Or you may lie on the floor and rest your lower legs on the seat of a chair.       Lie on your side with your knees bent, and place a pillow between your knees.       Lie on your stomach over pillows.      When should I call my doctor?    Your back pain should improve over the first couple of weeks. As it improves, you should be able to return to your normal activities. But call your doctor if:    You have a sudden change in your ability to control your bladder or bowels.    You feel tingling in your groin or legs.    The pain spreads down your leg and into your foot.    Your toes, feet or leg muscles feel weak.    You feel generally unwell or sick.    Your pain does not get better or gets worse.      If you are deaf or hard of hearing, please let us know. We provide many free services including sign language interpreters,oral interpreters, TTYs, telephone amplifiers, note takers and written materials.    For informational purposes only. Not to replace the advice of your health care provider. Copyright   2013 [facility]. All rights reserved. [place] 577860 - Rev 06/14.

## 2022-11-23 ENCOUNTER — OFFICE VISIT (OUTPATIENT)
Dept: UROLOGY | Facility: CLINIC | Age: 82
End: 2022-11-23
Payer: MEDICARE

## 2022-11-23 VITALS
WEIGHT: 210 LBS | DIASTOLIC BLOOD PRESSURE: 68 MMHG | HEART RATE: 64 BPM | BODY MASS INDEX: 31.83 KG/M2 | HEIGHT: 68 IN | SYSTOLIC BLOOD PRESSURE: 138 MMHG

## 2022-11-23 DIAGNOSIS — N40.1 BPH WITH OBSTRUCTION/LOWER URINARY TRACT SYMPTOMS: Primary | ICD-10-CM

## 2022-11-23 DIAGNOSIS — N13.8 BPH WITH OBSTRUCTION/LOWER URINARY TRACT SYMPTOMS: Primary | ICD-10-CM

## 2022-11-23 LAB — PR INTERVAL - MUSE: 24

## 2022-11-23 PROCEDURE — 51798 US URINE CAPACITY MEASURE: CPT | Performed by: UROLOGY

## 2022-11-23 PROCEDURE — 99203 OFFICE O/P NEW LOW 30 MIN: CPT | Mod: 25 | Performed by: UROLOGY

## 2022-11-23 RX ORDER — FINASTERIDE 5 MG/1
TABLET, FILM COATED ORAL
Qty: 90 TABLET | Refills: 3 | Status: SHIPPED | OUTPATIENT
Start: 2022-11-23 | End: 2023-11-28

## 2022-11-23 RX ORDER — TAMSULOSIN HYDROCHLORIDE 0.4 MG/1
0.4 CAPSULE ORAL DAILY
Qty: 90 CAPSULE | Refills: 3 | Status: SHIPPED | OUTPATIENT
Start: 2022-11-23 | End: 2024-01-03

## 2022-11-23 ASSESSMENT — PAIN SCALES - GENERAL: PAINLEVEL: NO PAIN (0)

## 2022-11-23 NOTE — PROGRESS NOTES
"Urology Consult History and Physical  [facility]  Name: Ranjan Pena    MRN: [id number]   YOB: 1940       We were asked to see Ranjan Pena at the request of SELF for evaluation and treatment of BPH with LUTS.        Chief Complaint:   BPH with LUTS    History is obtained from the patient and outside medical record            History of Present Illness:   Initially seen 8/28/2019:  Ranjan Pena is a 79 year old male who is being seen for evaluation of nocturnal dribbling and possible hematuria.      He had some sporadic episodes of leakage of a small amount of urine overnight which leaves a \"brownish/redish tinge\" to the sheets. Happens 7-8x/month for the past 4 months.   No prior history of gross hematuria.   This has not occurred for the past 4-5 nights.      On tamsulosin 0.4mg (Flomax) and finasteride 5mg (Proscar)      Follows with a Urologist in [place] - he sees him twice a year. Dr. Rivera.      PSA from his office:  4/23/18     2.95  4/19/17     3.79  4/18/16     2.92     AUASS: 4-5-8-4-2-0-0 = 9  QOL = 1  PVR = 42 ml     TODAY 11/23/2022:  Since his last visit he has followed with urology in [place]    Unfortunatley his wife passed away on 7/25/22, she was from [place] and since her passing he has sold their [place] home and will stay living in [place] primarily urologic    He is doing well with his urination and his symptoms are well controlled with finasteride and tamsulosin             Past Medical History:     Past Medical History:   Diagnosis Date     Glaucoma      Intermittent asthma      Nephrolithiasis 2010     DEB (obstructive sleep apnea)             Past Surgical History:     Past Surgical History:   Procedure Laterality Date     CHOLECYSTECTOMY, OPEN  2006     EYE SURGERY      cornea      ROTATOR CUFF REPAIR RT/LT  2000    LT            Social History:     Social History     Tobacco Use     Smoking status: Former     Packs/day: 1.00     Years: " "5.00     Pack years: 5.00     Types: Cigarettes     Quit date: 1965     Years since quittin.9     Smokeless tobacco: Never   Substance Use Topics     Alcohol use: Yes     Alcohol/week: 1.0 standard drink     Types: 1 Standard drinks or equivalent per week     Comment: 1 per month       History   Smoking Status     Former     Packs/day: 1.00     Years: 5.00     Types: Cigarettes     Quit date: 1965   Smokeless Tobacco     Never            Family History:     Family History   Problem Relation Age of Onset     Cancer Father         bladder cancer     Family History Negative Mother      Cancer Sister               Allergies:   No Known Allergies         Medications:     Current Outpatient Medications   Medication Sig     albuterol (PROAIR HFA/PROVENTIL HFA/VENTOLIN HFA) 108 (90 Base) MCG/ACT inhaler Inhale 1-2 puffs into the lungs every 4 hours as needed for shortness of breath / dyspnea or wheezing     finasteride (PROSCAR) 5 MG tablet TK 1 T PO QD AT THE ZULMA MEAL     latanoprost (XALATAN) 0.005 % ophthalmic solution Place 1 drop into both eyes At Bedtime     omeprazole (PRILOSEC) 20 MG DR capsule TK 1 C PO ONCE D     tamsulosin (FLOMAX) 0.4 MG capsule Take 1 capsule (0.4 mg) by mouth daily     timolol (TIMOPTIC) 0.5 % ophthalmic solution      order for DME Equipment being ordered: CPAP machine + all supplies incld mask     No current facility-administered medications for this visit.             Review of Systems:     Skin: negative  Eyes: negative  Ears/Nose/Throat: negative  Respiratory: No shortness of breath, dyspnea on exertion, cough, or hemoptysis  Cardiovascular: negative  Gastrointestinal: negative  Genitourinary: as above  Musculoskeletal: negative  Neurologic: negative  Psychiatric: negative  Hematologic/Lymphatic/Immunologic: negative  Endocrine: negative          Physical Exam:     Patient Vitals for the past 24 hrs:   BP Pulse Height Weight   22 1509 138/68 64 1.727 m (5' 8\") 95.3 kg " (210 lb)     Body mass index is 31.93 kg/m .     General: age-appropriate appearing male in NAD  HEENT: Head AT/NC, EOMI, CN Grossly intact  Lungs: no respiratory distress, or pursed lip breathing  Heart: No obvious jugular venous distension present  Back: no bony midline tenderness, no CVAT bilaterally.  Abdomen: soft, non-distended, non-tender. No organomegaly  Lymph: no palpable inguinal lymphadenopathy.  LE: no edema.   Musculoskeltal: extremities normal, no peripheral edema  Skin: no suspicious lesions or rashes  Neuro: Alert, oriented, speech and mentation normal;  moving all 4 extremities equally.  Psych: affect and mood normal          Data:   All laboratory data reviewed:    UA RESULTS:  Recent Labs   Lab Test 08/28/19  1052   COLOR Yellow   APPEARANCE Clear   URINEGLC Negative   URINEBILI Negative   URINEKETONE Negative   SG 1.010   UBLD Negative   URINEPH 6.0   PROTEIN Negative   UROBILINOGEN 0.2   NITRITE Negative   LEUKEST Negative     Outside PSA:  3/3/2022 = 1.37  1/11/2022 = 1.32  11/6/2020 = 1.18  5/18/2019 = 1.25  4/23/18     2.95  4/19/17     3.79  4/18/16     2.92      Lab Results   Component Value Date    CR 1.06 08/24/2020             Impression and Plan:   Impression:   82-year-old man with BPH and LUTS well-controlled with dual agent medical therapy      Plan:   BPH and LUTS  - We discussed the pathophysiology of the bladder and the prostate and the normal changes associated with the development of BPH and LUTS  - He is doing well with dual agent medical therapy with tamsulosin 0.4 mg daily and finasteride 5 mg daily.  He is not having any bothersome side effects  - Refill prescription sent to the pharmacy  - Follow-up in 1 year for symptom check    PSA screening  - I reviewed his PSA history and trend from his [place] urologist  - We discussed that guidelines would recommend cessation of PSA monitoring and given his low PSA values I am very comfortable with this  - No further PSA  monitoring     Thank you for the kind consultation.    Time spent: 20 minutes spent on the date of the encounter doing chart review, history and exam, documentation and further activities as noted above.    Everardo Zhang MD   Urology  [facility]  [facility] Phone: [phone]  [facility] Phone: [phone]

## 2022-11-23 NOTE — LETTER
"11/23/2022       RE: Ranjan Pena  [address]  [place] [address]     Dear Colleague,    Thank you for referring your patient, Ranjan Pena, to the [facility] at [facility]. Please see a copy of my visit note below.    Urology Consult History and Physical  [facility]  Name: Ranjan Pena    MRN: [id number]   YOB: 1940       We were asked to see Ranjan Pena at the request of SELF for evaluation and treatment of BPH with LUTS.        Chief Complaint:   BPH with LUTS    History is obtained from the patient and outside medical record            History of Present Illness:   Initially seen 8/28/2019:  Ranjan Pena is a 79 year old male who is being seen for evaluation of nocturnal dribbling and possible hematuria.      He had some sporadic episodes of leakage of a small amount of urine overnight which leaves a \"brownish/redish tinge\" to the sheets. Happens 7-8x/month for the past 4 months.   No prior history of gross hematuria.   This has not occurred for the past 4-5 nights.      On tamsulosin 0.4mg (Flomax) and finasteride 5mg (Proscar)      Follows with a Urologist in [place] - he sees him twice a year. Dr. Rivera.      PSA from his office:  4/23/18     2.95  4/19/17     3.79  4/18/16     2.92     AUASS: 7-8-1-4-2-0-0 = 9  QOL = 1  PVR = 42 ml     TODAY 11/23/2022:  Since his last visit he has followed with urology in [place]    Unfortunatley his wife passed away on 7/25/22, she was from [place] and since her passing he has sold their [place] home and will stay living in [place] primarily urologic    He is doing well with his urination and his symptoms are well controlled with finasteride and tamsulosin             Past Medical History:     Past Medical History:   Diagnosis Date     Glaucoma      Intermittent asthma      Nephrolithiasis 2010     DEB (obstructive sleep " apnea)             Past Surgical History:     Past Surgical History:   Procedure Laterality Date     CHOLECYSTECTOMY, OPEN  2006     EYE SURGERY      cornea      ROTATOR CUFF REPAIR RT/LT      LT            Social History:     Social History     Tobacco Use     Smoking status: Former     Packs/day: 1.00     Years: 5.00     Pack years: 5.00     Types: Cigarettes     Quit date: 1965     Years since quittin.9     Smokeless tobacco: Never   Substance Use Topics     Alcohol use: Yes     Alcohol/week: 1.0 standard drink     Types: 1 Standard drinks or equivalent per week     Comment: 1 per month       History   Smoking Status     Former     Packs/day: 1.00     Years: 5.00     Types: Cigarettes     Quit date: 1965   Smokeless Tobacco     Never            Family History:     Family History   Problem Relation Age of Onset     Cancer Father         bladder cancer     Family History Negative Mother      Cancer Sister               Allergies:   No Known Allergies         Medications:     Current Outpatient Medications   Medication Sig     albuterol (PROAIR HFA/PROVENTIL HFA/VENTOLIN HFA) 108 (90 Base) MCG/ACT inhaler Inhale 1-2 puffs into the lungs every 4 hours as needed for shortness of breath / dyspnea or wheezing     finasteride (PROSCAR) 5 MG tablet TK 1 T PO QD AT THE ZULMA MEAL     latanoprost (XALATAN) 0.005 % ophthalmic solution Place 1 drop into both eyes At Bedtime     omeprazole (PRILOSEC) 20 MG DR capsule TK 1 C PO ONCE D     tamsulosin (FLOMAX) 0.4 MG capsule Take 1 capsule (0.4 mg) by mouth daily     timolol (TIMOPTIC) 0.5 % ophthalmic solution      order for DME Equipment being ordered: CPAP machine + all supplies incld mask     No current facility-administered medications for this visit.             Review of Systems:     Skin: negative  Eyes: negative  Ears/Nose/Throat: negative  Respiratory: No shortness of breath, dyspnea on exertion, cough, or hemoptysis  Cardiovascular:  "negative  Gastrointestinal: negative  Genitourinary: as above  Musculoskeletal: negative  Neurologic: negative  Psychiatric: negative  Hematologic/Lymphatic/Immunologic: negative  Endocrine: negative          Physical Exam:     Patient Vitals for the past 24 hrs:   BP Pulse Height Weight   11/23/22 1509 138/68 64 1.727 m (5' 8\") 95.3 kg (210 lb)     Body mass index is 31.93 kg/m .     General: age-appropriate appearing male in NAD  HEENT: Head AT/NC, EOMI, CN Grossly intact  Lungs: no respiratory distress, or pursed lip breathing  Heart: No obvious jugular venous distension present  Back: no bony midline tenderness, no CVAT bilaterally.  Abdomen: soft, non-distended, non-tender. No organomegaly  Lymph: no palpable inguinal lymphadenopathy.  LE: no edema.   Musculoskeltal: extremities normal, no peripheral edema  Skin: no suspicious lesions or rashes  Neuro: Alert, oriented, speech and mentation normal;  moving all 4 extremities equally.  Psych: affect and mood normal          Data:   All laboratory data reviewed:    UA RESULTS:  Recent Labs   Lab Test 08/28/19  1052   COLOR Yellow   APPEARANCE Clear   URINEGLC Negative   URINEBILI Negative   URINEKETONE Negative   SG 1.010   UBLD Negative   URINEPH 6.0   PROTEIN Negative   UROBILINOGEN 0.2   NITRITE Negative   LEUKEST Negative     Outside PSA:  3/3/2022 = 1.37  1/11/2022 = 1.32  11/6/2020 = 1.18  5/18/2019 = 1.25  4/23/18     2.95  4/19/17     3.79  4/18/16     2.92      Lab Results   Component Value Date    CR 1.06 08/24/2020             Impression and Plan:   Impression:   82-year-old man with BPH and LUTS well-controlled with dual agent medical therapy      Plan:   BPH and LUTS  - We discussed the pathophysiology of the bladder and the prostate and the normal changes associated with the development of BPH and LUTS  - He is doing well with dual agent medical therapy with tamsulosin 0.4 mg daily and finasteride 5 mg daily.  He is not having any bothersome side " effects  - Refill prescription sent to the pharmacy  - Follow-up in 1 year for symptom check    PSA screening  - I reviewed his PSA history and trend from his [place] urologist  - We discussed that guidelines would recommend cessation of PSA monitoring and given his low PSA values I am very comfortable with this  - No further PSA monitoring     Thank you for the kind consultation.    Time spent: 20 minutes spent on the date of the encounter doing chart review, history and exam, documentation and further activities as noted above.    Everardo Zhang MD   Urology  [facility]  [facility] Phone: [phone]  [facility] Phone: [phone]

## 2022-11-23 NOTE — NURSING NOTE
Chief Complaint   Patient presents with     Benign Prostatic Hypertrophy   PVR- 24  Zena Whitley LPN

## 2022-12-13 ENCOUNTER — OFFICE VISIT (OUTPATIENT)
Dept: INTERNAL MEDICINE | Facility: CLINIC | Age: 82
End: 2022-12-13
Payer: MEDICARE

## 2022-12-13 VITALS
BODY MASS INDEX: 32.02 KG/M2 | TEMPERATURE: 97.2 F | HEIGHT: 68 IN | WEIGHT: 211.3 LBS | OXYGEN SATURATION: 95 % | RESPIRATION RATE: 14 BRPM | HEART RATE: 70 BPM | SYSTOLIC BLOOD PRESSURE: 118 MMHG | DIASTOLIC BLOOD PRESSURE: 68 MMHG

## 2022-12-13 DIAGNOSIS — G47.33 OSA (OBSTRUCTIVE SLEEP APNEA): ICD-10-CM

## 2022-12-13 DIAGNOSIS — J45.20 MILD INTERMITTENT ASTHMA WITHOUT COMPLICATION: ICD-10-CM

## 2022-12-13 DIAGNOSIS — Z13.1 SCREENING FOR DIABETES MELLITUS: ICD-10-CM

## 2022-12-13 DIAGNOSIS — Z00.00 ENCOUNTER FOR MEDICARE ANNUAL WELLNESS EXAM: Primary | ICD-10-CM

## 2022-12-13 DIAGNOSIS — R12 HEARTBURN: ICD-10-CM

## 2022-12-13 LAB
ANION GAP SERPL CALCULATED.3IONS-SCNC: 12 MMOL/L (ref 7–15)
BUN SERPL-MCNC: 19.8 MG/DL (ref 8–23)
CALCIUM SERPL-MCNC: 9.1 MG/DL (ref 8.8–10.2)
CHLORIDE SERPL-SCNC: 106 MMOL/L (ref 98–107)
CREAT SERPL-MCNC: 0.95 MG/DL (ref 0.67–1.17)
DEPRECATED HCO3 PLAS-SCNC: 24 MMOL/L (ref 22–29)
GFR SERPL CREATININE-BSD FRML MDRD: 79 ML/MIN/1.73M2
GLUCOSE SERPL-MCNC: 102 MG/DL (ref 70–99)
POTASSIUM SERPL-SCNC: 4.4 MMOL/L (ref 3.4–5.3)
SODIUM SERPL-SCNC: 142 MMOL/L (ref 136–145)

## 2022-12-13 PROCEDURE — 80048 BASIC METABOLIC PNL TOTAL CA: CPT | Performed by: INTERNAL MEDICINE

## 2022-12-13 PROCEDURE — 36415 COLL VENOUS BLD VENIPUNCTURE: CPT | Performed by: INTERNAL MEDICINE

## 2022-12-13 PROCEDURE — G0439 PPPS, SUBSEQ VISIT: HCPCS | Performed by: INTERNAL MEDICINE

## 2022-12-13 RX ORDER — ALBUTEROL SULFATE 90 UG/1
1-2 AEROSOL, METERED RESPIRATORY (INHALATION) EVERY 4 HOURS PRN
Qty: 18 G | Refills: 11 | Status: SHIPPED | OUTPATIENT
Start: 2022-12-13 | End: 2024-08-13

## 2022-12-13 ASSESSMENT — ASTHMA QUESTIONNAIRES
QUESTION_3 LAST FOUR WEEKS HOW OFTEN DID YOUR ASTHMA SYMPTOMS (WHEEZING, COUGHING, SHORTNESS OF BREATH, CHEST TIGHTNESS OR PAIN) WAKE YOU UP AT NIGHT OR EARLIER THAN USUAL IN THE MORNING: NOT AT ALL
ACT_TOTALSCORE: 24
QUESTION_1 LAST FOUR WEEKS HOW MUCH OF THE TIME DID YOUR ASTHMA KEEP YOU FROM GETTING AS MUCH DONE AT WORK, SCHOOL OR AT HOME: NONE OF THE TIME
QUESTION_2 LAST FOUR WEEKS HOW OFTEN HAVE YOU HAD SHORTNESS OF BREATH: NOT AT ALL
QUESTION_4 LAST FOUR WEEKS HOW OFTEN HAVE YOU USED YOUR RESCUE INHALER OR NEBULIZER MEDICATION (SUCH AS ALBUTEROL): NOT AT ALL
QUESTION_5 LAST FOUR WEEKS HOW WOULD YOU RATE YOUR ASTHMA CONTROL: WELL CONTROLLED
ACT_TOTALSCORE: 24

## 2022-12-13 ASSESSMENT — ENCOUNTER SYMPTOMS
DIZZINESS: 0
HEMATURIA: 0
MYALGIAS: 1
SHORTNESS OF BREATH: 0
SORE THROAT: 0
FREQUENCY: 0
NAUSEA: 0
HEMATOCHEZIA: 0
DIARRHEA: 0
DYSURIA: 0
EYE PAIN: 0
JOINT SWELLING: 0
HEADACHES: 0
NERVOUS/ANXIOUS: 0
PALPITATIONS: 0
CHILLS: 0
COUGH: 1
ABDOMINAL PAIN: 0
ARTHRALGIAS: 0
HEARTBURN: 0
CONSTIPATION: 0
WEAKNESS: 0
FEVER: 0

## 2022-12-13 ASSESSMENT — ACTIVITIES OF DAILY LIVING (ADL): CURRENT_FUNCTION: NO ASSISTANCE NEEDED

## 2022-12-13 ASSESSMENT — PAIN SCALES - GENERAL: PAINLEVEL: NO PAIN (0)

## 2022-12-13 NOTE — PROGRESS NOTES
"  SUBJECTIVE:   Ranjan is a 82 year old who presents for Preventive Visit.    Patient has been advised of split billing requirements and indicates understanding: Yes  Are you in the first 12 months of your Medicare coverage?  No    Healthy Habits:     In general, how would you rate your overall health?  Good    Frequency of exercise:  4-5 days/week    Duration of exercise:  15-30 minutes    Do you usually eat at least 4 servings of fruit and vegetables a day, include whole grains    & fiber and avoid regularly eating high fat or \"junk\" foods?  Yes    Taking medications regularly:  Yes    Medication side effects:  None    Ability to successfully perform activities of daily living:  No assistance needed    Home Safety:  No safety concerns identified    Hearing Impairment:  No hearing concerns    In the past 6 months, have you been bothered by leaking of urine?  No    In general, how would you rate your overall mental or emotional health?  Good      PHQ-2 Total Score: 0    Additional concerns today:  No      Have you ever done Advance Care Planning? (For example, a Health Directive, POLST, or a discussion with a medical provider or your loved ones about your wishes): Yes, advance care planning is on file.       Fall risk  Fallen 2 or more times in the past year?: No  Any fall with injury in the past year?: No    Cognitive Screening   1) Repeat 3 items (Leader, Season, Table)    2) Clock draw: ABNORMAL .  3) 3 item recall: Recalls 1 object   Results: ABNORMAL clock, 1-2 items recalled: PROBABLE COGNITIVE IMPAIRMENT, **INFORM PROVIDER**    Mini-CogTM Copyright RE Muñoz. Licensed by the author for use in [facility]; reprinted with permission (napoleon@.[facility]). All rights reserved.      Do you have sleep apnea, excessive snoring or daytime drowsiness?: yes    Reviewed and updated as needed this visit by clinical staff   Tobacco  Allergies  Meds  Problems    Fam Hx          Reviewed and updated as needed this " visit by Provider   Tobacco  Allergies  Meds  Problems    Fam Hx         Social History     Tobacco Use     Smoking status: Former     Packs/day: 1.00     Years: 5.00     Pack years: 5.00     Types: Cigarettes     Quit date: 1965     Years since quittin.9     Smokeless tobacco: Never   Substance Use Topics     Alcohol use: Yes     Alcohol/week: 1.0 standard drink     Types: 1 Standard drinks or equivalent per week     Comment: 1 per month         Alcohol Use 2022   Prescreen: >3 drinks/day or >7 drinks/week? No   Prescreen: >3 drinks/day or >7 drinks/week? -                Current providers sharing in care for this patient include:   Patient Care Team:  Pteer Nicole MD as PCP - General (Internal Medicine)  Peter Nicole MD as Assigned PCP  Goltz, Bennett Ezra, PA-C as Assigned Neuroscience Provider  Everardo Zhang MD as Assigned Surgical Provider    The following health maintenance items are reviewed in Epic and correct as of today:  Health Maintenance   Topic Date Due     ZOSTER IMMUNIZATION (1 of 2) Never done     ASTHMA ACTION PLAN  2017     ANNUAL REVIEW OF HM ORDERS  2023     ASTHMA CONTROL TEST  2023     MEDICARE ANNUAL WELLNESS VISIT  2023     FALL RISK ASSESSMENT  2023     DTAP/TDAP/TD IMMUNIZATION (3 - Td or Tdap) 2026     ADVANCE CARE PLANNING  2027     PHQ-2 (once per calendar year)  Completed     INFLUENZA VACCINE  Completed     Pneumococcal Vaccine: 65+ Years  Completed     COVID-19 Vaccine  Completed     IPV IMMUNIZATION  Aged Out     MENINGITIS IMMUNIZATION  Aged Out     Lab work is in process          Review of Systems   Constitutional: Negative for chills and fever.   HENT: Negative for congestion, ear pain and sore throat.    Eyes: Positive for visual disturbance. Negative for pain.   Respiratory: Positive for cough. Negative for shortness of breath.    Cardiovascular: Negative for chest pain, palpitations and peripheral  "edema.   Gastrointestinal: Negative for abdominal pain, constipation, diarrhea, heartburn, hematochezia and nausea.   Genitourinary: Negative for dysuria, frequency, hematuria, impotence, penile discharge and urgency.   Musculoskeletal: Positive for myalgias. Negative for arthralgias and joint swelling.   Skin: Negative for rash.   Neurological: Negative for dizziness, weakness and headaches.   Psychiatric/Behavioral: Negative for mood changes. The patient is not nervous/anxious.          OBJECTIVE:   /68   Pulse 70   Temp 97.2  F (36.2  C) (Temporal)   Resp 14   Ht 1.727 m (5' 8\")   Wt 95.8 kg (211 lb 4.8 oz)   SpO2 95%   BMI 32.13 kg/m   Estimated body mass index is 32.13 kg/m  as calculated from the following:    Height as of this encounter: 1.727 m (5' 8\").    Weight as of this encounter: 95.8 kg (211 lb 4.8 oz).  Physical Exam  GENERAL: alert, no distress and over weight  EYES: Eyes grossly normal to inspection, PERRL and conjunctivae and sclerae normal  HENT: ear canals and TM's normal, nose and mouth without ulcers or lesions  NECK: no adenopathy, no asymmetry, masses, or scars and thyroid normal to palpation  RESP: lungs clear to auscultation - no rales, rhonchi or wheezes  CV: regular rate and rhythm, normal S1 S2, no S3 or S4, no murmur, click or rub, no peripheral edema and peripheral pulses strong  ABDOMEN: soft, nontender, no hepatosplenomegaly, no masses and bowel sounds normal  MS: no gross musculoskeletal defects noted, no edema  SKIN: no suspicious lesions or rashes  NEURO: Normal strength and tone, mentation intact and speech normal  PSYCH: mentation appears normal, affect normal/bright  LYMPH: no cervical, supraclavicular, axillary, or inguinal adenopathy    Labs reviewed in Epic    Six Item Cognitive Impairment Test   (6CIT):      What year is it?                               Correct - 0 points    What month is it?                               Correct - 0 points      Give the " patient an address to remember with five components:   Ranjan Dave ( first and last name - 2 components)   [address]  (number and name of street - 2 components)   [place] ( city - 1 component)      About what time is it (within the hour)? Correct - 0 points    Count backwards from 20 to 1:   Correct - 0 points    Say the months of the year in reverse: Correct - 0 points    Repeat the address phrase:   2 errors - 4 points    Total 6CIT Score:      4/28    Interpretation: The 6CIT uses an inverse score and questions are weighted to produce a total out of 28. Scores of 0-7 are considered normal and 8 or more significant.    Advantages The test has high sensitivity without compromising specificity even in mild dementia. It is easy to translate linguistically and culturally.  Disadvantages The main disadvantage is in the scoring and weighting of the test, which is initially confusing, however computer models have simplified this greatly.    Probability Statistics: At the 7/8 cut off: Overall figures sensitivity 90% specificity 100%, in mild dementia sensitivity = 78% , specificity = 100%    Copyright 2000 [facility], [facility]. Courtesy of Dr. Dre Farrar    ASSESSMENT / PLAN:       ICD-10-CM    1. Encounter for Medicare annual wellness exam  Z00.00       2. Heartburn  R12 omeprazole (PRILOSEC) 20 MG DR capsule      3. Mild intermittent asthma without complication  J45.20 albuterol (PROAIR HFA/PROVENTIL HFA/VENTOLIN HFA) 108 (90 Base) MCG/ACT inhaler      4. DEB (obstructive sleep apnea)  G47.33       5. Screening for diabetes mellitus  Z13.1 Basic metabolic panel  (Ca, Cl, CO2, Creat, Gluc, K, Na, BUN)     Basic metabolic panel  (Ca, Cl, CO2, Creat, Gluc, K, Na, BUN)        -Updated screening, immunizations, prevention.  Please see health maintenance list, care gaps    Patient has been advised of split billing requirements and indicates understanding: Yes      COUNSELING:  Reviewed  "preventive health counseling, as reflected in patient instructions      BMI:   Estimated body mass index is 32.13 kg/m  as calculated from the following:    Height as of this encounter: 1.727 m (5' 8\").    Weight as of this encounter: 95.8 kg (211 lb 4.8 oz).         He reports that he quit smoking about 57 years ago. His smoking use included cigarettes. He has a 5.00 pack-year smoking history. He has never used smokeless tobacco.      Appropriate preventive services were discussed with this patient, including applicable screening as appropriate for cardiovascular disease, diabetes, osteopenia/osteoporosis, and glaucoma.  As appropriate for age/gender, discussed screening for colorectal cancer, prostate cancer, breast cancer, and cervical cancer. Checklist reviewing preventive services available has been given to the patient.    Reviewed patients plan of care and provided an AVS. The Basic Care Plan (routine screening as documented in Health Maintenance) for Ranjan meets the Care Plan requirement. This Care Plan has been established and reviewed with the Patient.          Peter Nicole MD  [facility]    Identified Health Risks:  "

## 2022-12-13 NOTE — LETTER
December 23, 2022      Ranjan Pena  [address]   [place] [facility] [address]        Dear ,    We are writing to inform you of your test results.    Your lab results are normal.      Resulted Orders   Basic metabolic panel  (Ca, Cl, CO2, Creat, Gluc, K, Na, BUN)   Result Value Ref Range    Sodium 142 136 - 145 mmol/L    Potassium 4.4 3.4 - 5.3 mmol/L    Chloride 106 98 - 107 mmol/L    Carbon Dioxide (CO2) 24 22 - 29 mmol/L    Anion Gap 12 7 - 15 mmol/L    Urea Nitrogen 19.8 8.0 - 23.0 mg/dL    Creatinine 0.95 0.67 - 1.17 mg/dL    Calcium 9.1 8.8 - 10.2 mg/dL    Glucose 102 (H) 70 - 99 mg/dL    GFR Estimate 79 >60 mL/min/1.73m2      Comment:      Effective December 21, 2021 eGFRcr in adults is calculated using the 2021 CKD-EPI creatinine equation which includes age and gender (Aileen et al., NEJM, DOI: 10.1056/IIWTpg9927156)       If you have any questions or concerns, please call the clinic at the number listed above.       Sincerely,      Peter Nicole MD

## 2022-12-13 NOTE — PATIENT INSTRUCTIONS
For heartburn  Can try over the counter   Famotidine or any antacid, or continue your omeprazole   Patient Education   Personalized Prevention Plan  You are due for the preventive services outlined below.  Your care team is available to assist you in scheduling these services.  If you have already completed any of these items, please share that information with your care team to update in your medical record.  Health Maintenance Due   Topic Date Due    Zoster (Shingles) Vaccine (1 of 2) Never done    Asthma Action Plan - yearly  03/17/2017

## 2023-02-01 ENCOUNTER — VIRTUAL VISIT (OUTPATIENT)
Dept: SLEEP MEDICINE | Facility: CLINIC | Age: 83
End: 2023-02-01
Payer: MEDICARE

## 2023-02-01 VITALS — WEIGHT: 210 LBS | HEIGHT: 68 IN | BODY MASS INDEX: 31.83 KG/M2

## 2023-02-01 DIAGNOSIS — G47.33 OSA (OBSTRUCTIVE SLEEP APNEA): Primary | ICD-10-CM

## 2023-02-01 PROCEDURE — 99443 PR PHYSICIAN TELEPHONE EVALUATION 21-30 MIN: CPT | Mod: 95 | Performed by: PHYSICIAN ASSISTANT

## 2023-02-01 ASSESSMENT — SLEEP AND FATIGUE QUESTIONNAIRES
HOW LIKELY ARE YOU TO NOD OFF OR FALL ASLEEP WHILE SITTING AND TALKING TO SOMEONE: WOULD NEVER DOZE
HOW LIKELY ARE YOU TO NOD OFF OR FALL ASLEEP WHILE SITTING QUIETLY AFTER LUNCH WITHOUT ALCOHOL: WOULD NEVER DOZE
HOW LIKELY ARE YOU TO NOD OFF OR FALL ASLEEP WHILE SITTING AND READING: SLIGHT CHANCE OF DOZING
HOW LIKELY ARE YOU TO NOD OFF OR FALL ASLEEP IN A CAR, WHILE STOPPED FOR A FEW MINUTES IN TRAFFIC: WOULD NEVER DOZE
HOW LIKELY ARE YOU TO NOD OFF OR FALL ASLEEP WHILE SITTING INACTIVE IN A PUBLIC PLACE: WOULD NEVER DOZE
HOW LIKELY ARE YOU TO NOD OFF OR FALL ASLEEP WHILE WATCHING TV: SLIGHT CHANCE OF DOZING
HOW LIKELY ARE YOU TO NOD OFF OR FALL ASLEEP WHILE LYING DOWN TO REST IN THE AFTERNOON WHEN CIRCUMSTANCES PERMIT: HIGH CHANCE OF DOZING
HOW LIKELY ARE YOU TO NOD OFF OR FALL ASLEEP WHEN YOU ARE A PASSENGER IN A CAR FOR AN HOUR WITHOUT A BREAK: WOULD NEVER DOZE

## 2023-02-01 ASSESSMENT — PAIN SCALES - GENERAL: PAINLEVEL: NO PAIN (0)

## 2023-02-01 NOTE — PROGRESS NOTES
Ranjan is a 82 year old who is being evaluated via a billable video visit.      How would you like to obtain your AVS? [employer]  If the video visit is dropped, the invitation should be resent by: Text to cell phone: [phone]  Will anyone else be joining your video visit? Sho Dave      Video-Visit Details    Type of service:  Video Visit -switched to phone visit.  Phone Start Time: 3:39 PM  Phone End Time:4:01 PM  Phone visit duration: 22 min    Originating Location (pt. Location): Home    Distant Location (provider location):  Off-site    I tried to send him a text link but Getachew read a message that the number failed validation, so I could not send a text.

## 2023-02-01 NOTE — LETTER
[facility] [place]  [address] [place] [id number]  Phone: [phone]        December 12, 2023      Ranjan Pena                                                                                                                                [address]   [place] [address]            Dear Mr. Domínguezter,    You are about due for a follow up visit at the sleep center. Please call [phone] to schedule your annual visit. Please bring your CPAP to the clinic or supply company for a download prior to the appointment. .     Last Visit:02/01/2023 with provider Bennett Goltz PA-C     Thanks,     [facility].

## 2023-02-01 NOTE — PROGRESS NOTES
CPAP Follow-Up Visit:    Date on this visit: 2/1/2023    Ranjan Pena has a follow-up of his CPAP use for treatment of moderate DEB and hypoxemia. He was initially seen at the [facility] for trouble keeping his CPAP mask on through the night for 18 years. His medical history is significant for DEB (initially diagnosed in 2006), asthma, glaucoma and nephrolithiasis.      His sleep study here showed:  AHI was 29.8/hr, with desaturations down to 74%. He spent 17.2 minutes below 90% SpO2 and 9.9 minutes below 89%. RDI 29.8/hr.  REM RDI 39.4/hr.  Supine RDI 59/hr.  His lateral RDI was 12/hr. Periodic Limb Movement Index 24/hour, <1/hr were associated with arousals.     He primarily has questions about the machine recall. He has not seen any evidence of particles in the system. He had been using SoClean nightly. He quit using CPAP around the beginning of the month. He has noticed any symptoms from the machine.  He has not noticed a lot of difference being off of CPAP. He may get a little less sleep.       He feels he is sleeping ok, averaging about 6 hours of sleep, sometimes 7 hours.   He got a Dreamstation 2 but it stopped working on him. It would run for 5 minutes and then stop. It did that 2 nights in a row. He brought it to a medical equipment company in [place] but they couldn't do anything.  He has been using his wife's machine after she passed away. Her machine is set at 6 cm. He set it at 6 cm, it felt right for him.     PAP machine: [employer] Imagine Dreamstation 1. Pressure settings: 7-10 cm    The download scanned in for me states the machine is set at a fixed pressure of 6 cm.  The compliance data shows that the patient used the CPAP for 30/30 nights, 100% of nights for >4 hours.  The 90th% pressure is 6 cm.  The average time in large leak is 2 sec.  The average nightly usage is 6:29.  The average AHI is 5.2/hr (he has occasional clusters of hypopneas or obstructive apnea). He sleeps  mostly on his sides but sometimes ends up on his back.         Interface:  Mask: nasal pillows mask  Chin strap: No  Leak: No  Using Humidifier: Yes, does not run out  Condensation in hose or mask: No     Difficulties with therapy:    [-] Snoring with CPAP:  [-] Difficulty tolerating the pressure:  [-] Epistaxis/dry nose:   [-] Nasal congestion:  [-] Dry mouth:  [-] Mouth breathing:   [-] Pain/skin breakdown:      Improvements noted with CPAP:   [+] waking up more refreshed  [+] sleeping better with less arousals  [+] nocturia improved   [+] improved energy level during the day    Weight change since sleep study: 210 lbs    Bedtime: 10:30-11 PM.  Wake time: 5:30-6:30 AM. Falls asleep in 5 minutes. Wakes 1 times per night for 5 minutes. Reason for waking: uncertain  Naps: very seldom. His energy is pretty good in the day.       Past medical/surgical history, family history, social history, medications and allergies were reviewed.      Problem List:  Patient Active Problem List    Diagnosis Date Noted     DEB (obstructive sleep apnea)      Priority: Medium     Split Night:  Sleep Study 6/9/2016 - (212.0 lbs) AHI 29.8, RDI 29.8, Supine AHI 59.0, REM AHI 47.1, Low O2% 73.7%, Time Spent ?88% 9.9, Time Spent ?89% 17.2, CPAP was titrated to a pressure of 11.       Advance Care Planning 12/20/2011     Priority: Medium     Advance Care Planning: Receipt of ACP document:  Received: Health Care Directive which was witnessed or notarized on 6-10-14.  Document previously scanned on 6-11-14.  Validation form completed and sent to be scanned.  Code Status reflects choices in most recent ACP document.  Confirmed/documented designated decision maker(s). See permanent comments section of demographics in clinical tab. View document(s) and details by clicking on code status. Added by Marilyn Downs RN, System ACP Coordinator [employer] on 7/2/2014.  Advance Care Planning: Initial facilitation introduction:   Ranjan Pena  presented for initial session regarding ACP at the clinic. He was accompanied by self. [employer] Choices information provided and resources reviewed. He currently wishes to complete an ACP document.  He currently has the following questions or concerns about Advance Care Planning: none.  Confirmed/documented designated decision maker(s). See permanent comments section of demographics in clinical tab. Orders entered for code status to reflect current choices.Added by Nancy Fischer on 6/10/2014  Patient states has Advance Directive and will bring in a copy to clinic. 12/20/2011 Brionna Hernándeztz           Mild intermittent asthma without complication      Priority: Medium     CARDIOVASCULAR SCREENING; LDL GOAL LESS THAN 160 10/31/2010     Priority: Medium        Impression/Plan:    (G47.33) DEB (obstructive sleep apnea)  (primary encounter diagnosis)  Comment: Ranjan's Dreamstation 2 which he obtained from [employer] due to the recall is no longer working. He has been using a Dreamstation 1 that was his wife's after she passed. That was set at 6 cm. His prior settings were 7-10 cm. The download shows he has occasional clusters of obstructive apneas or hypopneas, likely related to being on his back. He otherwise is using CPAP regularly and benefiting from use. He seems to be sleeping well and has good energy.  Plan: Comprehensive DME        I walked him through changing the pressures to 6-8 cm. A prescription was written for new supplies. I advised that he could talk to [employer] and send his Dreamstation 2 machine to them for repair.      He will follow up with me in about 1 year(s).     Phone visit duration: 22 min     Bennett Goltz, PA-C    CC: No ref. provider found

## 2023-02-07 NOTE — NURSING NOTE
Sent future staff message to sleep medicine scheduling team to remind patient of 1 year follow up appointment.     Jemma Collins MA

## 2023-03-20 NOTE — PROGRESS NOTES
Sleep Study Follow-Up Visit:    Date on this visit: 9/7/2017    Ranjan Pena comes in today for follow-up of his CPAP use for treatment of moderate DEB and hypoxemia. He was initially seen at the [facility] for trouble keeping his CPAP mask on through the night for 18 years. His medical history is significant for DEB (initially diagnosed in 2006), asthma, glaucoma and nephrolithiasis.     His sleep study here showed:  AHI was 29.8/hr, with desaturations down to 74%. He spent 17.2 minutes below 90% SpO2 and 9.9 minutes below 89%. RDI 29.8/hr.  REM RDI 39.4/hr.  Supine RDI 59/hr.  His lateral RDI was 12/hr. Periodic Limb Movement Index 24/hour, <1/hr were associated with arousals.     He is on auto CPAP 6-10 cm. He uses a nasal pillows mask. He puts the machine on nightly, but often wakes after 3-5 hours and removes the mask. He thinks he does sometimes end up on his back. He does not notice mask leak. He does not use the humidifier. He occasionally gets a dry mouth.   The compliance data shows that he has used the CPAP for 30/30 nights, 50% of nights for >4 hours.  The 90th% pressure is 9.3 cm.  The average time in large leak is 30 sec (only 3 nights of leak).  The average nightly usage is 4:20.  The average AHI is 2.2/hr.    His bedtime is about 10:30-11 PM. He falls asleep in about 15-20 minutes. He gets up about 5:30-6 AM. The CPAP shows he often removes the mask about 2:30-4 AM. He is not sure what wakes him at that time, sometimes his wife's cat when he is in [place]. He is bothered by the CPAP at that time, but he can't identify anything about it specifically that is bothersome.    Sometimes he goes back to sleep, sometimes not. He takes naps about once per week. The duration is about 20 minutes on average, but can vary. He feels his energy is good enough to do what he wants to do. He denies inadvertent dozing.   He denies shortness of breath at night or laying down.    Past  medical/surgical history, family history, social history, medications and allergies were reviewed.      Problem List:  Patient Active Problem List    Diagnosis Date Noted     Bladder polyp 06/04/2014     Priority: Medium     DEB (obstructive sleep apnea)      Priority: Medium     Split Night:  Sleep Study 6/9/2016 - (212.0 lbs) AHI 29.8, RDI 29.8, Supine AHI 59.0, REM AHI 47.1, Low O2% 73.7%, Time Spent ?88% 9.9, Time Spent ?89% 17.2, CPAP was titrated to a pressure of 11.       Advance Care Planning 12/20/2011     Priority: Medium     Advance Care Planning: Receipt of ACP document:  Received: Health Care Directive which was witnessed or notarized on 6-10-14.  Document previously scanned on 6-11-14.  Validation form completed and sent to be scanned.  Code Status reflects choices in most recent ACP document.  Confirmed/documented designated decision maker(s). See permanent comments section of demographics in clinical tab. View document(s) and details by clicking on code status. Added by Marilyn Dwons RN, System ACP Coordinator [employer] on 7/2/2014.  Advance Care Planning: Initial facilitation introduction:   Ranjan Pena presented for initial session regarding ACP at the clinic. He was accompanied by self. [employer] Cho[employer] information provided and resources reviewed. He currently wishes to complete an ACP document.  He currently has the following questions or concerns about Advance Care Planning: none.  Confirmed/documented designated decision maker(s). See permanent comments section of demographics in clinical tab. Orders entered for code status to reflect current choices.Added by Nancy Fischer on 6/10/2014  Patient states has Advance Directive and will bring in a copy to clinic. 12/20/2011 Brionna Martinez           Intermittent asthma      Priority: Medium     CARDIOVASCULAR SCREENING; LDL GOAL LESS THAN 160 10/31/2010     Priority: Medium        Impression/Plan:    (G47.33) DEB (obstructive sleep apnea)   (primary encounter diagnosis)  Comment: Although he puts it on nightly, his total usage is low. He removes it when he wakes in the middle of the night. He denies anything specifically bothering him at that time.  Plan: Comprehensive DME        Continue auto CPAP 6-10 cm. A prescription was written for new supplies (uses [facility] in [place]). He was advised to try to keep the CPAP mask on when he wakes. He was asked to contact me if he identifies anything specific that seems to bother him at that time. I showed him what the ramp button was for.       He will follow up with me in about 1 year(s).     Twenty-five minutes spent with patient, all of which were spent face-to-face counseling, consulting, coordinating plan of care.      Bennett Goltz, PA-C    CC: Peter Nicole MD      Patient baseline mental status/Awake/Symptoms improved

## 2023-11-24 DIAGNOSIS — N13.8 BPH WITH OBSTRUCTION/LOWER URINARY TRACT SYMPTOMS: ICD-10-CM

## 2023-11-24 DIAGNOSIS — N40.1 BPH WITH OBSTRUCTION/LOWER URINARY TRACT SYMPTOMS: ICD-10-CM

## 2023-11-28 RX ORDER — FINASTERIDE 5 MG/1
TABLET, FILM COATED ORAL
Qty: 90 TABLET | Refills: 3 | Status: SHIPPED | OUTPATIENT
Start: 2023-11-28 | End: 2024-01-17

## 2024-01-02 DIAGNOSIS — N40.1 BPH WITH OBSTRUCTION/LOWER URINARY TRACT SYMPTOMS: ICD-10-CM

## 2024-01-02 DIAGNOSIS — N13.8 BPH WITH OBSTRUCTION/LOWER URINARY TRACT SYMPTOMS: ICD-10-CM

## 2024-01-03 RX ORDER — TAMSULOSIN HYDROCHLORIDE 0.4 MG/1
0.4 CAPSULE ORAL DAILY
Qty: 90 CAPSULE | Refills: 0 | Status: SHIPPED | OUTPATIENT
Start: 2024-01-03 | End: 2024-01-17

## 2024-01-17 ENCOUNTER — OFFICE VISIT (OUTPATIENT)
Dept: UROLOGY | Facility: CLINIC | Age: 84
End: 2024-01-17
Payer: MEDICARE

## 2024-01-17 VITALS — OXYGEN SATURATION: 96 % | SYSTOLIC BLOOD PRESSURE: 156 MMHG | DIASTOLIC BLOOD PRESSURE: 91 MMHG | HEART RATE: 72 BPM

## 2024-01-17 DIAGNOSIS — N13.8 BPH WITH OBSTRUCTION/LOWER URINARY TRACT SYMPTOMS: Primary | ICD-10-CM

## 2024-01-17 DIAGNOSIS — N40.1 BPH WITH OBSTRUCTION/LOWER URINARY TRACT SYMPTOMS: Primary | ICD-10-CM

## 2024-01-17 LAB
ALBUMIN UR-MCNC: NEGATIVE MG/DL
APPEARANCE UR: CLEAR
BILIRUB UR QL STRIP: NEGATIVE
COLOR UR AUTO: YELLOW
GLUCOSE UR STRIP-MCNC: NEGATIVE MG/DL
HGB UR QL STRIP: ABNORMAL
KETONES UR STRIP-MCNC: NEGATIVE MG/DL
LEUKOCYTE ESTERASE UR QL STRIP: NEGATIVE
NITRATE UR QL: NEGATIVE
PH UR STRIP: 5.5 [PH] (ref 5–7)
RESIDUAL VOLUME (RV) (EXTERNAL): 43
SP GR UR STRIP: >=1.03 (ref 1–1.03)
UROBILINOGEN UR STRIP-ACNC: 0.2 E.U./DL

## 2024-01-17 PROCEDURE — 99213 OFFICE O/P EST LOW 20 MIN: CPT | Mod: 25 | Performed by: UROLOGY

## 2024-01-17 PROCEDURE — 81003 URINALYSIS AUTO W/O SCOPE: CPT | Mod: QW | Performed by: UROLOGY

## 2024-01-17 PROCEDURE — 51798 US URINE CAPACITY MEASURE: CPT | Performed by: UROLOGY

## 2024-01-17 RX ORDER — TAMSULOSIN HYDROCHLORIDE 0.4 MG/1
0.4 CAPSULE ORAL DAILY
Qty: 90 CAPSULE | Refills: 3 | Status: SHIPPED | OUTPATIENT
Start: 2024-01-17

## 2024-01-17 RX ORDER — FINASTERIDE 5 MG/1
TABLET, FILM COATED ORAL
Qty: 90 TABLET | Refills: 3 | Status: SHIPPED | OUTPATIENT
Start: 2024-01-17

## 2024-01-17 NOTE — Clinical Note
Pasha Nicole,  I followed up with Ranjan today for his annual visit.  He is doing very well with urinary symptoms well-controlled with tamsulosin 0.4 mg daily and finasteride 5 mg daily.  If you are willing to take over these medication prescription refills going forward, he may follow-up with me on a as needed basis.  Put in a prescription with refills for the next year.  Thanks,  Everardo Zhang M.D. Cell: [phone]

## 2024-01-17 NOTE — LETTER
"1/17/2024       RE: Ranjan Pena  [address]  [facility] [address]     Dear Colleague,    Thank you for referring your patient, Ranjan Pena, to the [facility] at [facility]. Please see a copy of my visit note below.    YUE  CHIEF COMPLAINT   It was my pleasure to see Ranjan Pena who is a 83 year old male for follow-up of Benign prostatic hyperplasia with LUTS.      HPI   Ranjan Pena is a very pleasant 83 year old male     Initially seen 8/28/2019:  Ranjan Pena is a 79 year old male who is being seen for evaluation of nocturnal dribbling and possible hematuria.      He had some sporadic episodes of leakage of a small amount of urine overnight which leaves a \"brownish/redish tinge\" to the sheets. Happens 7-8x/month for the past 4 months.   No prior history of gross hematuria.   This has not occurred for the past 4-5 nights.      On tamsulosin 0.4mg (Flomax) and finasteride 5mg (Proscar)      Follows with a Urologist in [place] - he sees him twice a year. Dr. Rivera.      PSA from his office:  4/23/18     2.95  4/19/17     3.79  4/18/16     2.92     AUASS: 6-2-7-4-2-0-0 = 9  QOL = 1  PVR = 42 ml      11/23/2022:  Since his last visit he has followed with urology in [place]     Unfortunatley his wife passed away on 7/25/22, she was from [place] and since her passing he has sold their [place] home and will stay living in [place] primarily urologic     He is doing well with his urination and his symptoms are well controlled with finasteride and tamsulosin    TODAY 1/17/2024:  Follow-up today for annual visit and medication refill  He is doing well with finasteride and tamsulosin with no bothersome side effects  He denies any urinary tract infections or other urinary issues in the past year    PHYSICAL EXAM  Patient is a 83 year old  male   Vitals: Blood pressure (!) 156/91, pulse " 72, SpO2 96%.  There is no height or weight on file to calculate BMI.  General Appearance Adult:   Alert, no acute distress, oriented  HENT: throat/mouth:normal, good dentition  Neuro: Alert, oriented, speech and mentation normal  Psych: affect and mood normal  Gait: Normal    Creatinine   Date Value Ref Range Status   12/13/2022 0.95 0.67 - 1.17 mg/dL Final   08/24/2020 1.06 0.66 - 1.25 mg/dL Final      UA RESULTS:  Recent Labs   Lab Test 08/28/19  1052   COLOR Yellow   APPEARANCE Clear   URINEGLC Negative   URINEBILI Negative   URINEKETONE Negative   SG 1.010   UBLD Negative   URINEPH 6.0   PROTEIN Negative   UROBILINOGEN 0.2   NITRITE Negative   LEUKEST Negative      Outside PSA:  3/3/2022 = 1.37  1/11/2022 = 1.32  11/6/2020 = 1.18  5/18/2019 = 1.25  4/23/18     2.95  4/19/17     3.79  4/18/16     2.92    ASSESSMENT and PLAN  83-year-old man with long history of BPH and LUTS well-controlled with dual agent therapy    BPH and LUTS  - We again discussed the pathophysiology of the bladder and the prostate and the normal changes associated with the development of BPH and LUTS  - He is doing well with tamsulosin 0.4 mg daily and finasteride 5 mg daily.  He is not having any side effects from these medications.  Refill prescription sent to the pharmacy  - Given that he is doing well with stable symptoms, we will send a message to his PCP  to take over future refills  - He may follow-up with me on a as needed basis  - Reviewed his outside PSA history and we again discussed the need for      Time spent: 15 minutes spent on the date of the encounter doing chart review, history and exam, documentation and further activities as noted above.    Everardo Zhang MD   Urology  [facility]  [facility] Phone: [phone]  [facility] Phone: [phone]

## 2024-01-17 NOTE — NURSING NOTE
Pt states he is the same since last year  No new urology problems.    PVR by scanner= 43ml    ZAIDA Mancera CMA

## 2024-06-10 NOTE — PROGRESS NOTES
CPAP Follow-Up Visit:    Date on this visit: 6/11/2024    Ranjan Pena has a  follow-up of his CPAP use for treatment of moderate DEB and hypoxemia. He was initially seen at the [facility] for trouble keeping his CPAP mask on through the night for 18 years. His medical history is significant for DEB (initially diagnosed in 2006), asthma, glaucoma and nephrolithiasis.      His sleep study here on 6/9/2016 showed:  AHI was 29.8/hr, with desaturations down to 74%. He spent 17.2 minutes below 90% SpO2 and 9.9 minutes below 89%. RDI 29.8/hr.  REM RDI 39.4/hr.  Supine RDI 59/hr.  His lateral RDI was 12/hr. Periodic Limb Movement Index 24/hour, <1/hr were associated with arousals. WT:  212#      Respironics Dreamstation 1    Auto-PAP 6-8 cmH2O 30 day usage data:    The compliance data shows that from 5/12/24-6/10/24 the patient used the CPAP for 30/30 nights, 100% of nights for >4 hours.  The 90th% pressure is 8 cm.  The average time in large leak is 6 sec.  The average nightly usage is 6:52.  The average AHI is 7.3/hr (1.2/hr CA, 1/hr OA, 5.1/hr hyp).       Ranjan is doing well with CPAP and does not have any significant concerns.            DME: [facility]    Do you use a CPAP Machine at home:   yes  Overall, on a scale of 0-10 how would you rate your CPAP (0 poor, 10 great):      What type of mask do you use:   nasal pillows  Is your mask comfortable:   yes  If not, why:    How often do you replace supplies: replaces pillows after 4-5 months, filters     Is your mask leaking:   no  If yes, where do you feel it:    How many night per week does the mask leak (0-7):      Do you notice snoring with mask on:   not aware of any but not observed  Do you notice gasping arousals with mask on:   no  Are you having significant oral or nasal dryness:  no  Are you using the humidifier: yes  Does the water chamber run out before the night is over:no  Do you get condensation in the mask or hose:no  Is the pressure setting  comfortable:  yes  If not, why:      Typical bedtime:   10:30 PM  Sleep latency on PAP therapy:   15 min  Typical wake time:  5:30-6 AM  Wakes 1 times per night for 10 minutes. Reason for waking: uncertain  How many hours on average per night are you using PAP therapy:    How many hours are you sleeping per night:    Do you feel well rested in the morning:  reasonably rested    Naps: 2 times per week for 20 minutes.         Weight change since sleep study: 210 lbs      Past medical/surgical history, family history, social history, medications and allergies were reviewed.      Problem List:  Patient Active Problem List    Diagnosis Date Noted    DEB (obstructive sleep apnea)      Priority: Medium     Split Night:  Sleep Study 6/9/2016 - (212.0 lbs) AHI 29.8, RDI 29.8, Supine AHI 59.0, REM AHI 47.1, Low O2% 73.7%, Time Spent ?88% 9.9, Time Spent ?89% 17.2, CPAP was titrated to a pressure of 11.      Advance Care Planning 12/20/2011     Priority: Medium     Advance Care Planning: Receipt of ACP document:  Received: Health Care Directive which was witnessed or notarized on 6-10-14.  Document previously scanned on 6-11-14.  Validation form completed and sent to be scanned.  Code Status reflects choices in most recent ACP document.  Confirmed/documented designated decision maker(s). See permanent comments section of demographics in clinical tab. View document(s) and details by clicking on code status. Added by Marilyn Downs RN, System ACP Coordinator [employer] on 7/2/2014.  Advance Care Planning: Initial facilitation introduction:   Ranjan Pena presented for initial session regarding ACP at the clinic. He was accompanied by self. [employer] Choices information provided and resources reviewed. He currently wishes to complete an ACP document.  He currently has the following questions or concerns about Advance Care Planning: none.  Confirmed/documented designated decision maker(s). See permanent comments section of  demographics in clinical tab. Orders entered for code status to reflect current choices.Added by Nancy Fischer on 6/10/2014  Patient states has Advance Directive and will bring in a copy to clinic. 12/20/2011 Brionna Martinez          Mild intermittent asthma without complication      Priority: Medium    CARDIOVASCULAR SCREENING; LDL GOAL LESS THAN 160 10/31/2010     Priority: Medium        Impression/Plan:    (G47.33) DEB (obstructive sleep apnea)  (primary encounter diagnosis)  Comment: Ranjan denies concerns about the CPAP or his sleep. He is using it nightly and benefits from use. His machine has flecks in the water chamber. I believe the machine he is using is recalled and shows signs that the sound insulating foam is flaking and getting into the airflow. His Dreamstation 2 broke. The machine he is using was his wife's.  His residual AHI is slightly elevated. He used to be on 7-10 cm and his AHI was <5/hr.   Plan: Comprehensive DME        Order placed for a replacement machine, ordered ASAP. In the mean time, he was advised to talk to [facility] about a bacterial filter for his current machine, or he can go off of CPAP and sleep on his side if he prefers. I changed his pressures back to auto CPAP 7-10 cm. A prescription was written for new supplies. We reviewed recommendations for cleaning and replacing supplies.        He will follow up with me in about 3 month(s).     24 minutes were spent on the date of the encounter doing chart review, history and exam, documentation and further activities as noted above.     Bennett Goltz, PA-C    CC: No ref. provider found

## 2024-06-11 ENCOUNTER — OFFICE VISIT (OUTPATIENT)
Dept: SLEEP MEDICINE | Facility: CLINIC | Age: 84
End: 2024-06-11
Payer: MEDICARE

## 2024-06-11 VITALS
OXYGEN SATURATION: 93 % | DIASTOLIC BLOOD PRESSURE: 77 MMHG | BODY MASS INDEX: 31.1 KG/M2 | HEIGHT: 69 IN | SYSTOLIC BLOOD PRESSURE: 138 MMHG | WEIGHT: 210 LBS | HEART RATE: 60 BPM

## 2024-06-11 DIAGNOSIS — G47.33 OSA (OBSTRUCTIVE SLEEP APNEA): Primary | ICD-10-CM

## 2024-06-11 PROCEDURE — 99213 OFFICE O/P EST LOW 20 MIN: CPT | Performed by: PHYSICIAN ASSISTANT

## 2024-06-11 NOTE — NURSING NOTE
"Chief Complaint   Patient presents with    CPAP Follow Up       Initial /77   Pulse 60   Ht 1.74 m (5' 8.5\")   Wt 95.3 kg (210 lb)   SpO2 93%   BMI 31.47 kg/m   Estimated body mass index is 31.47 kg/m  as calculated from the following:    Height as of this encounter: 1.74 m (5' 8.5\").    Weight as of this encounter: 95.3 kg (210 lb).    Medication Reconciliation: complete  ESS 1  Louisa Rangel MA   "

## 2024-07-02 ENCOUNTER — DOCUMENTATION ONLY (OUTPATIENT)
Dept: SLEEP MEDICINE | Facility: CLINIC | Age: 84
End: 2024-07-02
Payer: MEDICARE

## 2024-07-02 DIAGNOSIS — G47.33 OBSTRUCTIVE SLEEP APNEA (ADULT) (PEDIATRIC): Primary | ICD-10-CM

## 2024-07-02 NOTE — PROGRESS NOTES
Patient was offered choice of vendor and chose On license of [facility].  Patient Ranjan Pena was set up at [facility]  on July 2, 2024. Patient received a Resmed Airsense 11 Pressures were set at  7-10 cm H2O.   Patient s ramp is 5 cm H2O for Auto and FLEX/EPR is EPR 2.  Patient received a Resmed Mask name: LOFTON FX  Pillow mask size Standard, heated tubing and heated humidifier.  Patient has the following compliance requirements: using and visit requirements  Patient has a follow up on TBD.  Alan Hernandez

## 2024-07-05 ENCOUNTER — TELEPHONE (OUTPATIENT)
Dept: SLEEP MEDICINE | Facility: CLINIC | Age: 84
End: 2024-07-05
Payer: MEDICARE

## 2024-07-05 NOTE — TELEPHONE ENCOUNTER
General Call    Contacts       Contact Date/Time Type Contact Phone/Fax    07/05/2024 08:30 AM CDT Phone (Incoming) Ranjan Pena (Self) [phone] (M)          Reason for Call:  Patient received his new cpap machine on 7/02 and was told he needs a follow up within 30-90 days. Patient is scheduled on 01/14 but not in the 30-90 time frame and is wondering if he can be fitted for a appointment?    Date of last appointment with provider: 06/11/24    Okay to leave a detailed message?: Yes at Cell number on file:    Telephone Information:   Mobile [phone]     Rosalva Watt   [facility]  Central Scheduler

## 2024-08-13 ENCOUNTER — OFFICE VISIT (OUTPATIENT)
Dept: INTERNAL MEDICINE | Facility: CLINIC | Age: 84
End: 2024-08-13
Payer: MEDICARE

## 2024-08-13 VITALS
WEIGHT: 205.5 LBS | SYSTOLIC BLOOD PRESSURE: 130 MMHG | OXYGEN SATURATION: 95 % | DIASTOLIC BLOOD PRESSURE: 66 MMHG | BODY MASS INDEX: 30.44 KG/M2 | HEIGHT: 69 IN | HEART RATE: 57 BPM | TEMPERATURE: 97.9 F

## 2024-08-13 DIAGNOSIS — R12 HEARTBURN: ICD-10-CM

## 2024-08-13 DIAGNOSIS — Z13.1 SCREENING FOR DIABETES MELLITUS: ICD-10-CM

## 2024-08-13 DIAGNOSIS — Z00.00 ENCOUNTER FOR MEDICARE ANNUAL WELLNESS EXAM: Primary | ICD-10-CM

## 2024-08-13 DIAGNOSIS — J45.20 MILD INTERMITTENT ASTHMA WITHOUT COMPLICATION: ICD-10-CM

## 2024-08-13 PROCEDURE — G0439 PPPS, SUBSEQ VISIT: HCPCS | Performed by: INTERNAL MEDICINE

## 2024-08-13 RX ORDER — ALBUTEROL SULFATE 90 UG/1
1-2 AEROSOL, METERED RESPIRATORY (INHALATION) EVERY 4 HOURS PRN
Qty: 18 G | Refills: 11 | Status: SHIPPED | OUTPATIENT
Start: 2024-08-13

## 2024-08-13 SDOH — HEALTH STABILITY: PHYSICAL HEALTH: ON AVERAGE, HOW MANY MINUTES DO YOU ENGAGE IN EXERCISE AT THIS LEVEL?: 30 MIN

## 2024-08-13 SDOH — HEALTH STABILITY: PHYSICAL HEALTH: ON AVERAGE, HOW MANY DAYS PER WEEK DO YOU ENGAGE IN MODERATE TO STRENUOUS EXERCISE (LIKE A BRISK WALK)?: 6 DAYS

## 2024-08-13 ASSESSMENT — ASTHMA QUESTIONNAIRES
ACT_TOTALSCORE: 24
QUESTION_2 LAST FOUR WEEKS HOW OFTEN HAVE YOU HAD SHORTNESS OF BREATH: NOT AT ALL
QUESTION_4 LAST FOUR WEEKS HOW OFTEN HAVE YOU USED YOUR RESCUE INHALER OR NEBULIZER MEDICATION (SUCH AS ALBUTEROL): NOT AT ALL
QUESTION_3 LAST FOUR WEEKS HOW OFTEN DID YOUR ASTHMA SYMPTOMS (WHEEZING, COUGHING, SHORTNESS OF BREATH, CHEST TIGHTNESS OR PAIN) WAKE YOU UP AT NIGHT OR EARLIER THAN USUAL IN THE MORNING: NOT AT ALL
QUESTION_5 LAST FOUR WEEKS HOW WOULD YOU RATE YOUR ASTHMA CONTROL: WELL CONTROLLED
ACT_TOTALSCORE: 24
QUESTION_1 LAST FOUR WEEKS HOW MUCH OF THE TIME DID YOUR ASTHMA KEEP YOU FROM GETTING AS MUCH DONE AT WORK, SCHOOL OR AT HOME: NONE OF THE TIME

## 2024-08-13 ASSESSMENT — SOCIAL DETERMINANTS OF HEALTH (SDOH): HOW OFTEN DO YOU GET TOGETHER WITH FRIENDS OR RELATIVES?: THREE TIMES A WEEK

## 2024-08-13 NOTE — PATIENT INSTRUCTIONS
Patient Education   Preventive Care Advice   This is general advice given by our system to help you stay healthy. However, your care team may have specific advice just for you. Please talk to your care team about your preventive care needs.  Nutrition  Eat 5 or more servings of fruits and vegetables each day.  Try wheat bread, brown rice and whole grain pasta (instead of white bread, rice, and pasta).  Get enough calcium and vitamin D. Check the label on foods and aim for 100% of the RDA (recommended daily allowance).  Lifestyle  Exercise at least 150 minutes each week  (30 minutes a day, 5 days a week).  Do muscle strengthening activities 2 days a week. These help control your weight and prevent disease.  No smoking.  Wear sunscreen to prevent skin cancer.  Have a dental exam and cleaning every 6 months.  Yearly exams  See your health care team every year to talk about:  Any changes in your health.  Any medicines your care team has prescribed.  Preventive care, family planning, and ways to prevent chronic diseases.  Shots (vaccines)   HPV shots (up to age 26), if you've never had them before.  Hepatitis B shots (up to age 59), if you've never had them before.  COVID-19 shot: Get this shot when it's due.  Flu shot: Get a flu shot every year.  Tetanus shot: Get a tetanus shot every 10 years.  Pneumococcal, hepatitis A, and RSV shots: Ask your care team if you need these based on your risk.  Shingles shot (for age 50 and up)  General health tests  Diabetes screening:  Starting at age 35, Get screened for diabetes at least every 3 years.  If you are younger than age 35, ask your care team if you should be screened for diabetes.  Cholesterol test: At age 39, start having a cholesterol test every 5 years, or more often if advised.  Bone density scan (DEXA): At age 50, ask your care team if you should have this scan for osteoporosis (brittle bones).  Hepatitis C: Get tested at least once in your life.  STIs (sexually  transmitted infections)  Before age 24: Ask your care team if you should be screened for STIs.  After age 24: Get screened for STIs if you're at risk. You are at risk for STIs (including HIV) if:  You are sexually active with more than one person.  You don't use condoms every time.  You or a partner was diagnosed with a sexually transmitted infection.  If you are at risk for HIV, ask about PrEP medicine to prevent HIV.  Get tested for HIV at least once in your life, whether you are at risk for HIV or not.  Cancer screening tests  Cervical cancer screening: If you have a cervix, begin getting regular cervical cancer screening tests starting at age 21.  Breast cancer scan (mammogram): If you've ever had breasts, begin having regular mammograms starting at age 40. This is a scan to check for breast cancer.  Colon cancer screening: It is important to start screening for colon cancer at age 45.  Have a colonoscopy test every 10 years (or more often if you're at risk) Or, ask your provider about stool tests like a FIT test every year or Cologuard test every 3 years.  To learn more about your testing options, visit:   .  For help making a decision, visit:   https://bit.ly/nf52752.  Prostate cancer screening test: If you have a prostate, ask your care team if a prostate cancer screening test (PSA) at age 55 is right for you.  Lung cancer screening: If you are a current or former smoker ages 50 to 80, ask your care team if ongoing lung cancer screenings are right for you.  For informational purposes only. Not to replace the advice of your health care provider. Copyright   2023 [facility]. All rights reserved. Clinically reviewed by the [facility] Transitions Program. [employer] [id number] - REV 01/24.  Substance Use Disorder: Care Instructions  Overview     You can improve your life and health by stopping your use of alcohol or drugs. When you don't drink or use drugs, you may feel and sleep better. You may  get along better with your family, friends, and coworkers. There are medicines and programs that can help with substance use disorder.  How can you care for yourself at home?  Here are some ways to help you stay sober and prevent relapse.  If you have been given medicine to help keep you sober or reduce your cravings, be sure to take it exactly as prescribed.  Talk to your doctor about programs that can help you stop using drugs or drinking alcohol.  Do not keep alcohol or drugs in your home.  Plan ahead. Think about what you'll say if other people ask you to drink or use drugs. Try not to spend time with people who drink or use drugs.  Use the time and money spent on drinking or drugs to do something that's important to you.  Preventing a relapse  Have a plan to deal with relapse. Learn to recognize changes in your thinking that lead you to drink or use drugs. Get help before you start to drink or use drugs again.  Try to stay away from situations, friends, or places that may lead you to drink or use drugs.  If you feel the need to drink alcohol or use drugs again, seek help right away. Call a trusted friend or family member. Some people get support from organizations such as Narcotics Anonymous or [employer] or from treatment facilities.  If you relapse, get help as soon as you can. Some people make a plan with another person that outlines what they want that person to do for them if they relapse. The plan usually includes how to handle the relapse and who to notify in case of relapse.  Don't give up. Remember that a relapse doesn't mean that you have failed. Use the experience to learn the triggers that lead you to drink or use drugs. Then quit again. Recovery is a lifelong process. Many people have several relapses before they are able to quit for good.  Follow-up care is a key part of your treatment and safety. Be sure to make and go to all appointments, and call your doctor if you are having problems. It's  "also a good idea to know your test results and keep a list of the medicines you take.  When should you call for help?   Call 911  anytime you think you may need emergency care. For example, call if you or someone else:    Has overdosed or has withdrawal signs. Be sure to tell the emergency workers that you are or someone else is using or trying to quit using drugs. Overdose or withdrawal signs may include:  Losing consciousness.  Seizure.  Seeing or hearing things that aren't there (hallucinations).     Is thinking or talking about suicide or harming others.   Where to get help 24 hours a day, 7 days a week   If you or someone you know talks about suicide, self-harm, a mental health crisis, a substance use crisis, or any other kind of emotional distress, get help right away. You can:    Call the Suicide and Crisis Lifeline at 988.     Call 4-686-773-TALK (1-512.362.6298).     Text HOME to 828052 to access the Crisis Text Line.   Consider saving these numbers in your phone.  Go to 0-6.com.amiando for more information or to chat online.  Call your doctor now or seek immediate medical care if:    You are having withdrawal symptoms. These may include nausea or vomiting, sweating, shakiness, and anxiety.   Watch closely for changes in your health, and be sure to contact your doctor if:    You have a relapse.     You need more help or support to stop.   Where can you learn more?  Go to https://www.gopogo.net/patiented  Enter H573 in the search box to learn more about \"Substance Use Disorder: Care Instructions.\"  Current as of: November 15, 2023               Content Version: 14.0    6120-1474 [employer].   Care instructions adapted under license by your healthcare professional. If you have questions about a medical condition or this instruction, always ask your healthcare professional. [employer] disclaims any warranty or liability for your use of this information.         "

## 2024-08-13 NOTE — PROGRESS NOTES
"Preventive Care Visit  [facility]  Peter Nicole MD, Internal Medicine  Aug 13, 2024      Assessment & Plan     Encounter for Medicare annual wellness exam  Updated screening, immunizations, prevention.  Please see health maintenance list, care gaps  Has tested normal on our mini-cog but he is concerned about his memory- will have him do SELVIN test at home and return     Mild intermittent asthma without complication  - albuterol (PROAIR HFA/PROVENTIL HFA/VENTOLIN HFA) 108 (90 Base) MCG/ACT inhaler; Inhale 1-2 puffs into the lungs every 4 hours as needed for shortness of breath or wheezing    Heartburn  - omeprazole (PRILOSEC) 20 MG DR capsule; Take 1 capsule (20 mg) by mouth daily    Screening for diabetes mellitus  - BASIC METABOLIC PANEL; Future    Patient has been advised of split billing requirements and indicates understanding: Yes        BMI  Estimated body mass index is 30.79 kg/m  as calculated from the following:    Height as of this encounter: 1.74 m (5' 8.5\").    Weight as of this encounter: 93.2 kg (205 lb 8 oz).   Weight management plan: Discussed healthy diet and exercise guidelines    Counseling  Appropriate preventive services were addressed with this patient via screening, questionnaire, or discussion as appropriate for fall prevention, nutrition, physical activity, Tobacco-use cessation, weight loss and cognition.  Checklist reviewing preventive services available has been given to the patient.  Reviewed patient's diet, addressing concerns and/or questions.   He is at risk for psychosocial distress and has been provided with information to reduce risk.       See Patient Instructions    Abby Woodruff is a 84 year old, presenting for the following:  Wellness Visit  ACT completed       Health Care Directive  Patient has a Health Care Directive on file  Advance care planning document is on file and is current.    HPI  Concerned about memory            8/13/2024   General " Health   How would you rate your overall physical health? Good   Feel stress (tense, anxious, or unable to sleep) Only a little      (!) STRESS CONCERN      8/13/2024   Nutrition   Diet: Regular (no restrictions)            8/13/2024   Exercise   Days per week of moderate/strenous exercise 6 days   Average minutes spent exercising at this level 30 min            8/13/2024   Social Factors   Frequency of gathering with friends or relatives Three times a week   Worry food won't last until get money to buy more No   Food not last or not have enough money for food? No   Do you have housing? (Housing is defined as stable permanent housing and does not include staying ouside in a car, in a tent, in an abandoned building, in an overnight shelter, or couch-surfing.) Yes   Are you worried about losing your housing? No   Lack of transportation? No   Unable to get utilities (heat,electricity)? No            8/13/2024   Fall Risk   Fallen 2 or more times in the past year? No    No   Trouble with walking or balance? No    No       Multiple values from one day are sorted in reverse-chronological order          8/13/2024   Activities of Daily Living- Home Safety   Needs help with the following daily activites None of the above   Safety concerns in the home None of the above            8/13/2024   Dental   Dentist two times every year? Yes            8/13/2024   Hearing Screening   Hearing concerns? None of the above            8/13/2024   Driving Risk Screening   Patient/family members have concerns about driving No            8/13/2024   General Alertness/Fatigue Screening   Have you been more tired than usual lately? No            8/13/2024   Urinary Incontinence Screening   Bothered by leaking urine in past 6 months No            8/13/2024   TB Screening   Were you born outside of the [place]? No            Today's PHQ-2 Score:       8/13/2024     2:23 PM   PHQ-2 ( 1999 [employer])   Q1: Little interest or pleasure in doing things 0    Q2: Feeling down, depressed or hopeless 0   PHQ-2 Score 0   Q1: Little interest or pleasure in doing things Not at all   Q2: Feeling down, depressed or hopeless Not at all   PHQ-2 Score 0           2024   Substance Use   Alcohol more than 3/day or more than 7/wk No   Do you have a current opioid prescription? No   How severe/bad is pain from 1 to 10? 0/10 (No Pain)   Do you use any other substances recreationally? No    (!) PRESCRIPTION DRUGS       Multiple values from one day are sorted in reverse-chronological order     Social History     Tobacco Use    Smoking status: Former     Current packs/day: 0.00     Average packs/day: 1 pack/day for 5.0 years (5.0 ttl pk-yrs)     Types: Cigarettes     Start date: 1960     Quit date: 1965     Years since quittin.6    Smokeless tobacco: Never   Substance Use Topics    Alcohol use: Yes     Alcohol/week: 1.0 standard drink of alcohol     Types: 1 Standard drinks or equivalent per week     Comment: 1 per month    Drug use: No             Reviewed and updated as needed this visit by Provider   Tobacco       Fam Hx            Lab work is in process  Labs reviewed in EPIC  Current providers sharing in care for this patient include:  Patient Care Team:  Peter Nicole MD as PCP - General (Internal Medicine)  Peter Nicole MD as Assigned PCP  Everardo Zhang MD as Assigned Surgical Provider  Goltz, Bennett Ezra, PA-C as Assigned Neuroscience Provider    The following health maintenance items are reviewed in Epic and correct as of today:  Health Maintenance   Topic Date Due    ZOSTER IMMUNIZATION (1 of 2) Never done    RSV VACCINE (Pregnancy & 60+) (1 - 1-dose 60+ series) Never done    ASTHMA ACTION PLAN  2017    BMP  2023    INFLUENZA VACCINE (1) 2024    ASTHMA CONTROL TEST  2025    MEDICARE ANNUAL WELLNESS VISIT  2025    ANNUAL REVIEW OF HM ORDERS  2025    FALL RISK ASSESSMENT  2025    DTAP/TDAP/TD  "IMMUNIZATION (2 - Td or Tdap) 03/08/2026    ADVANCE CARE PLANNING  12/13/2027    PHQ-2 (once per calendar year)  Completed    Pneumococcal Vaccine: 65+ Years  Completed    COVID-19 Vaccine  Completed    IPV IMMUNIZATION  Aged Out    HPV IMMUNIZATION  Aged Out    MENINGITIS IMMUNIZATION  Aged Out    RSV MONOCLONAL ANTIBODY  Aged Out            Objective    Exam  /66   Pulse 57   Temp 97.9  F (36.6  C) (Temporal)   Ht 1.74 m (5' 8.5\")   Wt 93.2 kg (205 lb 8 oz)   SpO2 95%   BMI 30.79 kg/m     Estimated body mass index is 30.79 kg/m  as calculated from the following:    Height as of this encounter: 1.74 m (5' 8.5\").    Weight as of this encounter: 93.2 kg (205 lb 8 oz).    Physical Exam  GENERAL: alert and no distress  EYES: Eyes grossly normal to inspection, PERRL and conjunctivae and sclerae normal  HENT: ear canals and TM's normal, nose and mouth without ulcers or lesions  NECK: no adenopathy, no asymmetry, masses, or scars  RESP: lungs clear to auscultation - no rales, rhonchi or wheezes  CV: regular rate and rhythm, normal S1 S2, no S3 or S4, no murmur, click or rub, no peripheral edema  ABDOMEN: soft, nontender, no hepatosplenomegaly, no masses and bowel sounds normal  MS: no gross musculoskeletal defects noted, no edema  SKIN: no suspicious lesions or rashes  NEURO: Normal strength and tone, mentation intact and speech normal  PSYCH: mentation appears normal, affect normal/bright  LYMPH: no cervical adenopathy         8/13/2024   Mini Cog   Clock Draw Score 2 Normal   3 Item Recall 1 object recalled   Mini Cog Total Score 3                 Signed Electronically by: Peter Nicole MD    "

## 2024-08-27 ENCOUNTER — LAB (OUTPATIENT)
Dept: LAB | Facility: CLINIC | Age: 84
End: 2024-08-27
Attending: INTERNAL MEDICINE
Payer: MEDICARE

## 2024-08-27 DIAGNOSIS — Z13.1 SCREENING FOR DIABETES MELLITUS: ICD-10-CM

## 2024-08-27 LAB
ANION GAP SERPL CALCULATED.3IONS-SCNC: 9 MMOL/L (ref 7–15)
BUN SERPL-MCNC: 15.6 MG/DL (ref 8–23)
CALCIUM SERPL-MCNC: 9.1 MG/DL (ref 8.8–10.4)
CHLORIDE SERPL-SCNC: 105 MMOL/L (ref 98–107)
CREAT SERPL-MCNC: 1.03 MG/DL (ref 0.67–1.17)
EGFRCR SERPLBLD CKD-EPI 2021: 71 ML/MIN/1.73M2
GLUCOSE SERPL-MCNC: 104 MG/DL (ref 70–99)
HCO3 SERPL-SCNC: 27 MMOL/L (ref 22–29)
POTASSIUM SERPL-SCNC: 4.6 MMOL/L (ref 3.4–5.3)
SODIUM SERPL-SCNC: 141 MMOL/L (ref 135–145)

## 2024-08-27 PROCEDURE — 36415 COLL VENOUS BLD VENIPUNCTURE: CPT

## 2024-08-27 PROCEDURE — 80048 BASIC METABOLIC PNL TOTAL CA: CPT

## 2024-09-09 NOTE — PROGRESS NOTES
CPAP Follow-Up Visit:    Date on this visit: 9/10/2024    Ranjan Pena has a  follow-up of his CPAP use for treatment of moderate DEB and hypoxemia. He was initially seen at the [facility] for trouble keeping his CPAP mask on through the night for 18 years. His medical history is significant for DEB (initially diagnosed in 2006), asthma, glaucoma and nephrolithiasis.      His sleep study here on 6/9/2016 showed:  AHI was 29.8/hr, with desaturations down to 74%. He spent 17.2 minutes below 90% SpO2 and 9.9 minutes below 89%. RDI 29.8/hr.  REM RDI 39.4/hr.  Supine RDI 59/hr.  His lateral RDI was 12/hr. Periodic Limb Movement Index 24/hour, <1/hr were associated with arousals. WT:  212#      ResMed   Auto-PAP 7.0 - 10.0 cmH2O 30 day usage data:    100% of days with > 4 hours of use. 0/30 days with no use.   Average use 425 minutes per day.   95%ile Leak 35.46 L/min.   CPAP 95% pressure 9.8 cm.   AHI 2.28 events per hour.       Ranjan presents to document compliance to a recently obtained replacement CPAP.   He feels the new machine is more comfortable than the old one.        DME: [facility]    Do you use a CPAP Machine at home: Yes  Overall, on a scale of 0-10 how would you rate your CPAP (0 poor, 10 great):      What type of mask do you use: Nasal Pillow Carbajal FX  Is your mask comfortable: Yes  If not, why:    How often do you replace supplies: filters monthly    Is your mask leaking: No  If yes, where do you feel it:    How many night per week does the mask leak (0-7):      Do you notice snoring with mask on: No  Do you notice gasping arousals with mask on: No  Are you having significant oral or nasal dryness: No. Actually feels he has a lot of phlegm/mucus in morning in the nose and throat. He has to blow his nose for about 30 minutes in the morning  Are you using the humidifier: yes  Does the water chamber run out before the night is over:usually not  Do you get condensation in the mask or hose: no  Is  the pressure setting comfortable: Yes  If not, why:      Typical bedtime: 1030  Sleep latency on PAP therapy: 600  Typical wake time: 600  Wakes 1 times per night for <5 minutes. Reason for waking: unsure, look at the clock  How many hours on average per night are you using PAP therapy: 6  How many hours are you sleeping per night: 630  Do you feel well rested in the morning: Yes    Naps: very seldom          Weight change since sleep study: 203 lbs      Past medical/surgical history, family history, social history, medications and allergies were reviewed.      Problem List:  Patient Active Problem List    Diagnosis Date Noted    DEB (obstructive sleep apnea)      Priority: Medium     Split Night:  Sleep Study 6/9/2016 - (212.0 lbs) AHI 29.8, RDI 29.8, Supine AHI 59.0, REM AHI 47.1, Low O2% 73.7%, Time Spent ?88% 9.9, Time Spent ?89% 17.2, CPAP was titrated to a pressure of 11.      Mild intermittent asthma without complication      Priority: Medium    CARDIOVASCULAR SCREENING; LDL GOAL LESS THAN 160 10/31/2010     Priority: Medium        Impression/Plan:    (G47.33) DEB (obstructive sleep apnea)  (primary encounter diagnosis)  Comment: Ranjan is using his new CPAP regularly and benefits from use. His download shows his leak is a little elevated at times, looks like mask leak mostly. He denies dry mouth. He has a runny and congested nose in the morning, having to blow his nose for 30 minutes in the morning.   Plan: Comprehensive DME        Continue auto CPAP 7-10 cm. A prescription was written for new supplies. We reviewed recommendations for cleaning and replacing supplies. [place] with the humidity settings and/or consider nasal saline gel spray to see if you have less congestion when you wake.        He will follow up with me in about 1 year(s).     18 minutes were spent on the date of the encounter doing chart review, history and exam, documentation and further activities as noted above.     Bennett Goltz,  PACalebC    CC: Referred Self

## 2024-09-10 ENCOUNTER — OFFICE VISIT (OUTPATIENT)
Dept: SLEEP MEDICINE | Facility: CLINIC | Age: 84
End: 2024-09-10
Payer: MEDICARE

## 2024-09-10 VITALS
WEIGHT: 203 LBS | OXYGEN SATURATION: 94 % | SYSTOLIC BLOOD PRESSURE: 137 MMHG | DIASTOLIC BLOOD PRESSURE: 80 MMHG | BODY MASS INDEX: 30.07 KG/M2 | HEIGHT: 69 IN | HEART RATE: 59 BPM

## 2024-09-10 DIAGNOSIS — G47.33 OSA (OBSTRUCTIVE SLEEP APNEA): Primary | ICD-10-CM

## 2024-09-10 PROCEDURE — 99213 OFFICE O/P EST LOW 20 MIN: CPT | Performed by: PHYSICIAN ASSISTANT

## 2024-09-10 NOTE — PATIENT INSTRUCTIONS
[place] with the humidity settings and/or consider nasal saline gel spray to see if you have less congestion when you wake.

## 2024-09-10 NOTE — NURSING NOTE
"Chief Complaint   Patient presents with    CPAP Follow Up       Initial BP (!) 145/79   Pulse 59   Ht 1.74 m (5' 8.5\")   Wt 92.1 kg (203 lb)   SpO2 94%   BMI 30.42 kg/m   Estimated body mass index is 30.42 kg/m  as calculated from the following:    Height as of this encounter: 1.74 m (5' 8.5\").    Weight as of this encounter: 92.1 kg (203 lb).    Medication Reconciliation: complete  ESS 2  Louisa Rangel MA   "

## 2024-11-14 NOTE — NURSING NOTE
"Chief Complaint   Patient presents with     Physical       Initial /78  Pulse 55  Temp 98.1  F (36.7  C) (Oral)  Wt 224 lb 6.4 oz (101.8 kg)  SpO2 95%  BMI 33.14 kg/m2 Estimated body mass index is 33.14 kg/(m^2) as calculated from the following:    Height as of 9/7/17: 5' 9\" (1.753 m).    Weight as of this encounter: 224 lb 6.4 oz (101.8 kg).  Medication Reconciliation: complete    " [FreeTextEntry1] : Renee Rodarte NP was the supervising provider for this infusion.

## 2025-04-08 ENCOUNTER — TELEPHONE (OUTPATIENT)
Dept: INTERNAL MEDICINE | Facility: CLINIC | Age: 85
End: 2025-04-08
Payer: MEDICARE

## 2025-04-09 DIAGNOSIS — N40.1 BPH WITH OBSTRUCTION/LOWER URINARY TRACT SYMPTOMS: ICD-10-CM

## 2025-04-09 DIAGNOSIS — N13.8 BPH WITH OBSTRUCTION/LOWER URINARY TRACT SYMPTOMS: ICD-10-CM
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
## 2025-04-14 RX ORDER — FINASTERIDE 5 MG/1
TABLET, FILM COATED ORAL
Qty: 30 TABLET | Refills: 0 | Status: SHIPPED | OUTPATIENT
Start: 2025-04-14

## 2025-04-14 RX ORDER — TAMSULOSIN HYDROCHLORIDE 0.4 MG/1
0.4 CAPSULE ORAL DAILY
Qty: 30 CAPSULE | Refills: 0 | Status: SHIPPED | OUTPATIENT
Start: 2025-04-14

## 2025-05-13 ENCOUNTER — OFFICE VISIT (OUTPATIENT)
Dept: UROLOGY | Facility: CLINIC | Age: 85
End: 2025-05-13
Payer: MEDICARE

## 2025-05-13 VITALS
BODY MASS INDEX: 28.79 KG/M2 | SYSTOLIC BLOOD PRESSURE: 167 MMHG | OXYGEN SATURATION: 97 % | HEART RATE: 62 BPM | HEIGHT: 68 IN | DIASTOLIC BLOOD PRESSURE: 80 MMHG | WEIGHT: 190 LBS

## 2025-05-13 DIAGNOSIS — N40.1 BPH WITH OBSTRUCTION/LOWER URINARY TRACT SYMPTOMS: ICD-10-CM

## 2025-05-13 DIAGNOSIS — N13.8 BPH WITH OBSTRUCTION/LOWER URINARY TRACT SYMPTOMS: ICD-10-CM

## 2025-05-13 PROCEDURE — 99214 OFFICE O/P EST MOD 30 MIN: CPT | Performed by: PHYSICIAN ASSISTANT

## 2025-05-13 PROCEDURE — 3079F DIAST BP 80-89 MM HG: CPT | Performed by: PHYSICIAN ASSISTANT

## 2025-05-13 PROCEDURE — 3077F SYST BP >= 140 MM HG: CPT | Performed by: PHYSICIAN ASSISTANT

## 2025-05-13 PROCEDURE — 1126F AMNT PAIN NOTED NONE PRSNT: CPT | Performed by: PHYSICIAN ASSISTANT

## 2025-05-13 RX ORDER — TAMSULOSIN HYDROCHLORIDE 0.4 MG/1
0.4 CAPSULE ORAL DAILY
Qty: 90 CAPSULE | Refills: 4 | Status: SHIPPED | OUTPATIENT
Start: 2025-05-13

## 2025-05-13 RX ORDER — FINASTERIDE 5 MG/1
TABLET, FILM COATED ORAL
Qty: 90 TABLET | Refills: 4 | Status: SHIPPED | OUTPATIENT
Start: 2025-05-13

## 2025-05-13 ASSESSMENT — PAIN SCALES - GENERAL: PAINLEVEL_OUTOF10: NO PAIN (0)

## 2025-05-13 NOTE — PROGRESS NOTES
"[facility]  CHIEF COMPLAINT   It was my pleasure to see Ranjan Pena who is a 85 year old male for follow-up of Benign prostatic hyperplasia with LUTS.      HPI   Ranjan Pena is a very pleasant 85 year old male     Initially seen 8/28/2019:  Ranjan Pena is a 79 year old male who is being seen for evaluation of nocturnal dribbling and possible hematuria.      He had some sporadic episodes of leakage of a small amount of urine overnight which leaves a \"brownish/redish tinge\" to the sheets. Happens 7-8x/month for the past 4 months.   No prior history of gross hematuria.   This has not occurred for the past 4-5 nights.      On tamsulosin 0.4mg (Flomax) and finasteride 5mg (Proscar)      Follows with a Urologist in [place] - he sees him twice a year. Dr. Rivera.      PSA from his office:  4/23/18     2.95  4/19/17     3.79  4/18/16     2.92     AUASS: 3-3-2-4-2-0-0 = 9  QOL = 1  PVR = 42 ml      11/23/2022:  Since his last visit he has followed with urology in [place]     Unfortunatley his wife passed away on 7/25/22, she was from [place] and since her passing he has sold their [place] home and will stay living in [place] primarily.      He is doing well with his urination and his symptoms are well controlled with finasteride and tamsulosin    1/17/2024:  Follow-up today for annual visit and medication refill  He is doing well with finasteride and tamsulosin with no bothersome side effects  He denies any urinary tract infections or other urinary issues in the past year    TODAY 5/13/25:  Here for med refill.   No UTIs or gross hematuria in the interim.     PHYSICAL EXAM  Patient is a 85 year old  male   Vitals: Blood pressure (!) 167/80, pulse 62, height 1.727 m (5' 8\"), weight 86.2 kg (190 lb), SpO2 97%.  Body mass index is 28.89 kg/m .  General Appearance Adult:   Alert, no acute distress, oriented  HENT: throat/mouth:normal, good dentition  Neuro: Alert, oriented, speech and " mentation normal  Psych: affect and mood normal  Gait: Normal    Creatinine   Date Value Ref Range Status   08/27/2024 1.03 0.67 - 1.17 mg/dL Final   08/24/2020 1.06 0.66 - 1.25 mg/dL Final        Outside PSA:  3/3/2022 = 1.37  1/11/2022 = 1.32  11/6/2020 = 1.18  5/18/2019 = 1.25  4/23/18     2.95  4/19/17     3.79  4/18/16     2.92    ASSESSMENT and PLAN  85-year-old man with long history of BPH and LUTS well-controlled with dual agent therapy    BPH and LUTS  - We again discussed the pathophysiology of the bladder and the prostate and the normal changes associated with the development of BPH and LUTS over the years  - He is doing well with tamsulosin 0.4 mg daily and finasteride 5 mg daily.  He is not having any side effects from these medications.  Refill prescriptions sent to the pharmacy.  - He may follow-up with me in 1 year for refills  - Reviewed his outside PSA history and no longer age appropriate for screening.       Time spent: 15 minutes spent on the date of the encounter doing chart review, history and exam, documentation and further activities as noted above.

## 2025-05-13 NOTE — LETTER
"5/13/2025       RE: Ranjan Pena  [address]  [place] [id number]     Dear Colleague,    Thank you for referring your patient, Ranjan Pena, to the [facility]. Please see a copy of my visit note below.    YUE  CHIEF COMPLAINT   It was my pleasure to see Ranjan Pena who is a 85 year old male for follow-up of Benign prostatic hyperplasia with LUTS.      HPI   Ranjan Pena is a very pleasant 85 year old male     Initially seen 8/28/2019:  Ranjan Pena is a 79 year old male who is being seen for evaluation of nocturnal dribbling and possible hematuria.      He had some sporadic episodes of leakage of a small amount of urine overnight which leaves a \"brownish/redish tinge\" to the sheets. Happens 7-8x/month for the past 4 months.   No prior history of gross hematuria.   This has not occurred for the past 4-5 nights.      On tamsulosin 0.4mg (Flomax) and finasteride 5mg (Proscar)      Follows with a Urologist in [place] - he sees him twice a year. Dr. Rivera.      PSA from his office:  4/23/18     2.95  4/19/17     3.79  4/18/16     2.92     AUASS: 8-1-1-4-2-0-0 = 9  QOL = 1  PVR = 42 ml      11/23/2022:  Since his last visit he has followed with urology in [place]     Unfortunatley his wife passed away on 7/25/22, she was from [place] and since her passing he has sold their [place] home and will stay living in [place] primarily.      He is doing well with his urination and his symptoms are well controlled with finasteride and tamsulosin    1/17/2024:  Follow-up today for annual visit and medication refill  He is doing well with finasteride and tamsulosin with no bothersome side effects  He denies any urinary tract infections or other urinary issues in the past year    TODAY 5/13/25:  Here for med refill.   No UTIs or gross hematuria in the interim.     PHYSICAL " "EXAM  Patient is a 85 year old  male   Vitals: Blood pressure (!) 167/80, pulse 62, height 1.727 m (5' 8\"), weight 86.2 kg (190 lb), SpO2 97%.  Body mass index is 28.89 kg/m .  General Appearance Adult:   Alert, no acute distress, oriented  HENT: throat/mouth:normal, good dentition  Neuro: Alert, oriented, speech and mentation normal  Psych: affect and mood normal  Gait: Normal    Creatinine   Date Value Ref Range Status   08/27/2024 1.03 0.67 - 1.17 mg/dL Final   08/24/2020 1.06 0.66 - 1.25 mg/dL Final        Outside PSA:  3/3/2022 = 1.37  1/11/2022 = 1.32  11/6/2020 = 1.18  5/18/2019 = 1.25  4/23/18     2.95  4/19/17     3.79  4/18/16     2.92    ASSESSMENT and PLAN  85-year-old man with long history of BPH and LUTS well-controlled with dual agent therapy    BPH and LUTS  - We again discussed the pathophysiology of the bladder and the prostate and the normal changes associated with the development of BPH and LUTS over the years  - He is doing well with tamsulosin 0.4 mg daily and finasteride 5 mg daily.  He is not having any side effects from these medications.  Refill prescriptions sent to the pharmacy.  - He may follow-up with me in 1 year for refills  - Reviewed his outside PSA history and no longer age appropriate for screening.       Time spent: 15 minutes spent on the date of the encounter doing chart review, history and exam, documentation and further activities as noted above.            Again, thank you for allowing me to participate in the care of your patient.      Sincerely,    Nyla Santillan PA-C, LORRI    "

## 2025-09-03 DIAGNOSIS — R12 HEARTBURN: ICD-10-CM

## 2025-09-03 RX ORDER — OMEPRAZOLE 20 MG/1
20 CAPSULE, DELAYED RELEASE ORAL DAILY
Qty: 90 CAPSULE | Refills: 0 | Status: SHIPPED | OUTPATIENT
Start: 2025-09-03